# Patient Record
Sex: FEMALE | Race: BLACK OR AFRICAN AMERICAN | Employment: FULL TIME | URBAN - METROPOLITAN AREA
[De-identification: names, ages, dates, MRNs, and addresses within clinical notes are randomized per-mention and may not be internally consistent; named-entity substitution may affect disease eponyms.]

---

## 2017-10-17 DIAGNOSIS — G40.109 LOCALIZATION-RELATED EPILEPSY (H): ICD-10-CM

## 2017-10-17 RX ORDER — LEVETIRACETAM 1000 MG/1
1 TABLET ORAL 2 TIMES DAILY
Qty: 60 TABLET | Refills: 0 | Status: SHIPPED | OUTPATIENT
Start: 2017-10-17 | End: 2017-11-10

## 2017-10-17 RX ORDER — LEVETIRACETAM 250 MG/1
250 TABLET ORAL 2 TIMES DAILY
Qty: 60 TABLET | Refills: 0 | Status: SHIPPED | OUTPATIENT
Start: 2017-10-17 | End: 2017-11-10

## 2017-10-17 NOTE — TELEPHONE ENCOUNTER
[facility] Call Center    Phone Message    Name of Caller: SANTOS COONEY    Phone Number: Cell number on file:    Telephone Information:   Mobile [phone]       Best time to return call: REFILL on KEPPRA and TRILEPTAL.  [facility]  [place] [place] [facility] [address] [facility] a detailed message be left on voicemail: yes    Relation to patient: Self    Reason for Call: Other: REFILL needed.  (call came through the  or  call center)     Action Taken: Message routed to:  Adult Clinics: Neurology p [id number]

## 2017-10-17 NOTE — TELEPHONE ENCOUNTER
Called pt. Stated she only has 4 days left of medications. I did request that she schedule an appointment with Dr. Reynaga before we put her refill through. Pt did schedule with Dr. Reynaga for 11/10. I did inform pt I would give her enough medication to get her to appointment but that she would need to be seen prior to any further refills. Izzy Jeong RN      Pending Prescriptions:                       Disp   Refills    levETIRAcetam (KEPPRA) 250 MG tablet      60 tab*0            Sig: Take 1 tablet (250 mg) by mouth 2 times daily    levETIRAcetam 1000 MG TABS                60 tab*0            Sig: Take 1 tablet by mouth 2 times daily    OXcarbazepine (TRILEPTAL) 300 MG tablet   120 ta*0            Sig: TAKE TWO TABLETS BY MOUTH TWICE A DAY        Last Written Prescription Date: 12/19/16  Last Fill Quantity: 180,  # refills: 2  Last Office Visit with [facility] or [facility] prescribing provider: 12/19/16                                         Next 5 appointments (look out 90 days)     Nov 10, 2017  8:00 AM CST   Return Visit with Cheryl Kumari MD   [facility]    [address] [place] [address] [phone]                  Will route Oxycarbazepine Rx to Dr. Reynaga/MAGUI white to get it signed. Izzy Jeong RN

## 2017-10-18 RX ORDER — OXCARBAZEPINE 300 MG/1
TABLET, FILM COATED ORAL
Qty: 120 TABLET | Refills: 0 | Status: SHIPPED | OUTPATIENT
Start: 2017-10-18 | End: 2017-11-10

## 2017-11-10 DIAGNOSIS — G40.109 LOCALIZATION-RELATED EPILEPSY (H): ICD-10-CM

## 2017-11-10 RX ORDER — LEVETIRACETAM 1000 MG/1
1 TABLET ORAL 2 TIMES DAILY
Qty: 60 TABLET | Refills: 2 | Status: SHIPPED | OUTPATIENT
Start: 2017-11-10 | End: 2018-01-12

## 2017-11-10 RX ORDER — FOLIC ACID 1 MG/1
1000 TABLET ORAL 2 TIMES DAILY
Qty: 60 TABLET | Refills: 2 | Status: SHIPPED | OUTPATIENT
Start: 2017-11-10 | End: 2018-01-12

## 2017-11-10 RX ORDER — LEVETIRACETAM 250 MG/1
250 TABLET ORAL 2 TIMES DAILY
Qty: 60 TABLET | Refills: 2 | Status: SHIPPED | OUTPATIENT
Start: 2017-11-10 | End: 2018-01-12

## 2017-11-10 RX ORDER — OXCARBAZEPINE 300 MG/1
TABLET, FILM COATED ORAL
Qty: 120 TABLET | Refills: 2 | Status: SHIPPED | OUTPATIENT
Start: 2017-11-10 | End: 2018-01-12

## 2017-11-10 NOTE — TELEPHONE ENCOUNTER
[facility] Call Center    Phone Message    Name of Caller: Nancy Anderson    Phone Number: Cell number on file:    Telephone Information:   Mobile [phone]       Best time to return call: Anytime    May a detailed message be left on voicemail: yes    Relation to patient: Self    Reason for Call: Medication Refill Request    Has the patient contacted the pharmacy for the refill? No - Direct patient to contact their pharmacy.  The pharmacy will send the requests to us on their behalf.        Action Taken: Message routed to:  Adult Clinics: Neurology p [id number]

## 2017-11-10 NOTE — TELEPHONE ENCOUNTER
The pt was rescheduled for follow up for first available on 1/19/18. Request routed to Dr Reynaga to review and sign pending prescriptions.   Yuli Russell RN

## 2018-01-12 DIAGNOSIS — G40.109 LOCALIZATION-RELATED EPILEPSY (H): ICD-10-CM

## 2018-01-12 RX ORDER — FOLIC ACID 1 MG/1
1000 TABLET ORAL 2 TIMES DAILY
Qty: 60 TABLET | Refills: 0 | Status: SHIPPED | OUTPATIENT
Start: 2018-01-12 | End: 2018-02-05

## 2018-01-12 RX ORDER — OXCARBAZEPINE 300 MG/1
TABLET, FILM COATED ORAL
Qty: 120 TABLET | Refills: 0 | Status: SHIPPED | OUTPATIENT
Start: 2018-01-12 | End: 2018-02-05

## 2018-01-12 RX ORDER — LEVETIRACETAM 250 MG/1
250 TABLET ORAL 2 TIMES DAILY
Qty: 60 TABLET | Refills: 0 | Status: SHIPPED | OUTPATIENT
Start: 2018-01-12 | End: 2018-02-05

## 2018-01-12 RX ORDER — LEVETIRACETAM 1000 MG/1
1 TABLET ORAL 2 TIMES DAILY
Qty: 60 TABLET | Refills: 0 | Status: SHIPPED | OUTPATIENT
Start: 2018-01-12 | End: 2018-02-05

## 2018-01-12 NOTE — TELEPHONE ENCOUNTER
The pt arrived thinking her appt with Dr Reynaga was today. Her appt was actually made for next Friday 1/19/18. She goes to school in [place] and will not be back in town until February 4th-7th. Her appt with Juhi next week was cancelled and moved to the [facility] location for 2/5/18 at 11:30am. The pt was informed and she repeated back the date and time to me and also given the address. She will need a 1 month supply to last her until that appt. Request routed to Dr Reynaga to review and sign.  Yuli Russell RN

## 2018-02-05 ENCOUNTER — OFFICE VISIT (OUTPATIENT)
Dept: NEUROLOGY | Facility: CLINIC | Age: 23
End: 2018-02-05
Payer: COMMERCIAL

## 2018-02-05 VITALS
WEIGHT: 187.8 LBS | BODY MASS INDEX: 25.44 KG/M2 | DIASTOLIC BLOOD PRESSURE: 85 MMHG | HEIGHT: 72 IN | SYSTOLIC BLOOD PRESSURE: 132 MMHG | TEMPERATURE: 98.1 F | RESPIRATION RATE: 16 BRPM | HEART RATE: 55 BPM

## 2018-02-05 DIAGNOSIS — G40.109 LOCALIZATION-RELATED EPILEPSY (H): ICD-10-CM

## 2018-02-05 RX ORDER — OXCARBAZEPINE 300 MG/1
TABLET, FILM COATED ORAL
Qty: 540 TABLET | Refills: 3 | Status: SHIPPED | OUTPATIENT
Start: 2018-02-05 | End: 2018-08-23

## 2018-02-05 RX ORDER — FOLIC ACID 1 MG/1
1000 TABLET ORAL DAILY
Qty: 90 TABLET | Refills: 3 | Status: SHIPPED | OUTPATIENT
Start: 2018-02-05 | End: 2022-02-25

## 2018-02-05 RX ORDER — LEVETIRACETAM 250 MG/1
250 TABLET ORAL 2 TIMES DAILY
Qty: 180 TABLET | Refills: 3 | Status: SHIPPED | OUTPATIENT
Start: 2018-02-05 | End: 2019-03-11

## 2018-02-05 RX ORDER — LEVETIRACETAM 1000 MG/1
1 TABLET ORAL 2 TIMES DAILY
Qty: 180 TABLET | Refills: 3 | Status: SHIPPED | OUTPATIENT
Start: 2018-02-05 | End: 2019-03-11

## 2018-02-05 ASSESSMENT — PAIN SCALES - GENERAL: PAINLEVEL: NO PAIN (0)

## 2018-02-05 NOTE — PROGRESS NOTES
"[facility] Progress Note      Patient:  Nancy Anderson  :  1995   Age:  22 year old   Today's Office Visit:  2018    Epilepsy Data:    The patient's seizures started in  when she was 17 years old. She was traveling with her basketball team and she had a seizure. She had no warning with that. She had loss of consciousness and per description she had a generalized tonic-clonic seizure with foaming at mouth and tongue biting. She had 3 of those seizures in 1 day 2 hours apart. Mom witnessed the last one, in which she shook all over and tilted her head and her face turned purple. These happen rarely since she started on Keppra, and they only happen when she misses her medication. The last one was 2014. The patient describes she has no warning. Her mom has noted that she turns her head but does not remember which side. She stiffens up and shakes all over. She has frothing at the mouth, tongue biting and urinary incontinence. They usually last 1-2 minutes. Her eyes roll back. It takes about 15 minutes for her to come back to baseline, then she has a headache and is very sleepy.      She has another type of spell which started when she was 11 years old. She calls them dizzy spells. They have never been diagnosed as seizures. These never changed on Keppra. With those, she gets a feeling of disorientation and feeling dizzy. She says sometimes shegets spinning sensation and sometimes lightheadedness and then she loses consciousness. Mom says she will stare into space and says \"yup, yup\". She may blink. She may fumble with her hands. She also will turn her head but not sure which side and is unresponsive. She is amnestic about the event. These currently happen 5 times a month. It depends on her activity. The frequency varies. These started when she was playing sports, and they could have happened 3-4 times a week when she was very active.   Triggers for her seizures are lack of sleep, exercise. " With many of her seizures, when the blood sugar was checked, it was in the 50s, so mom thinks that not eating well is a trigger for her seizures, but she does not think so.   RISK FACTORS FOR EPILEPSY: Mom denies gestational or  complications. She was born 2 weeks early and weighed 9 pounds 14 ounces. She stayed in the hospital for 2 days for jaundice. She denies meningitis, encephalitis or febrile seizures. No family history of epilepsy. When she was between age 10-12, she used to hit her head when she was getting angry, but she never lost consciousness, and there was not a significant head injury that they are aware of.        History of Present Illness:    The patient is here for a follow up on her seizures.  She continues to have complex partial seizures about once a month.  She feels dizzy before the seizure happen, so she can get to a safe place.  They last 15-30 sec.  Since she started OXC, seizures have decreased from 3-4 times a week, but she wasn't seizure-free.  She denies side effects including unsteadiness, dizziness, blurred vision, double vision, depression, irritability or mod swings.      Current Outpatient Prescriptions   Medication Sig Dispense Refill     OXcarbazepine (TRILEPTAL) 300 MG tablet TAKE TWO TABLETS BY MOUTH TWICE A  tablet 0     levETIRAcetam 1000 MG TABS Take 1 tablet by mouth 2 times daily 60 tablet 0     levETIRAcetam (KEPPRA) 250 MG tablet Take 1 tablet (250 mg) by mouth 2 times daily 60 tablet 0     folic acid (FOLVITE) 1 MG tablet Take 1 tablet (1,000 mcg) by mouth 2 times daily 60 tablet 0     IBUPROFEN PO Take 800 mg by mouth every 6 hours as needed for moderate pain        LORazepam (LORAZEPAM INTENSOL) 2 MG/ML concentrated solution Administer 2 mg lorazepam between cheek and gum after a GTC seizure or 2 complex partial seizures in 24 hours. (Patient not taking: Reported on 2018) 30 mL 0      Results for SANTOS COONEY (MRN [id number]) as of 2018  "11:46   Ref. Range 12/19/2016 09:40   Levetiracetam Level Unknown 35.2   10 Hydroxy Metabolite Level Unknown 15.3     Perceived AED Side Effects:  No    Medication Notes:        AED Medication Compliance:  compliant most of the time    Review of Systems:  Lethargy / Tiredness:  No  Nausea / Vomiting:  No  Double Vision:  No  Sleepiness:  No  Depression:  No  Slowed Cognitive Function:  No  Memory Problems:  No  Poor Balance:  No  Dizziness:  No  Appetite Changes:  No  Blurred Vision:  No  Sleep Changes:  No  Behavioral Changes:  No  Skin: negative  Respiratory: No shortness of breath and No cough  Cardiovascular: negative  Have you experienced a traumatic fall since your last visit: NO    Other Issues:    Is patient safe to drive:  No    Woman Care:   Patient is:    Sexually Active:  Yes  Type of Birth Control: IUD  Pregnant:  no.    Exam:    /85 (BP Location: Right arm, Patient Position: Chair, Cuff Size: Adult Regular)  Pulse 55  Temp 98.1  F (36.7  C)  Resp 16  Ht 6' 0.05\" (183 cm)  Wt 187 lb 12.8 oz (85.2 kg)  BMI 25.44 kg/m2     Wt Readings from Last 5 Encounters:   02/05/18 187 lb 12.8 oz (85.2 kg)   03/17/16 178 lb 14.4 oz (81.1 kg)   08/20/15 193 lb (87.5 kg)   07/22/15 191 lb 3.2 oz (86.7 kg)   06/04/15 187 lb 11.2 oz (85.1 kg)     GENERAL APPEARANCE: Alert, awake, cooperative, in no apparent distress.   LANGUAGE AND SPEECH: No aphasia or dysarthria.   CRANIAL NERVES: Extra-ocular movements are intact, face symmetric, tongue midline.  MOTOR:normal tone, bulk and motor strength 5/5 with no drift.   COORDINATION: Normal finger-to-nose.   GAIT: Gait and tandem gait are steady.      Assessment and Plan:      Localization-related epilepsy:  The patient's seizure frequency reduced on oxcarbazepine from 3-4/m to once a month. She is taking  mg bid.  She denies side effects.  I instructed her to increase OXC to 900 mg bid.     - Increase OXC to 600-900 for 1 week and then 900 mg bid.     - " Continue LVT 1250 mg bid    - Take folic acid 1 mg daily    - RTC in 1 year        As described above, I met with the patient for 25 minutes and during this time counseling was greater than 50% of the visit time.  Cheryl Kumari MD

## 2018-02-05 NOTE — LETTER
"2018       RE: Nancy Anderson  : 1995   MRN: [id number]      Dear Colleague,    Thank you for referring your patient, Nancy Anderson, to the [facility] EPILEPSY CARE at [facility]. Please see a copy of my visit note below.    [facility]      Patient:  Nancy Anderson  :  1995   Age:  22 year old   Today's Office Visit:  2018    Epilepsy Data:    The patient's seizures started in  when she was 17 years old. She was traveling with her basketball team and she had a seizure. She had no warning with that. She had loss of consciousness and per description she had a generalized tonic-clonic seizure with foaming at mouth and tongue biting. She had 3 of those seizures in 1 day 2 hours apart. Mom witnessed the last one, in which she shook all over and tilted her head and her face turned purple. These happen rarely since she started on Keppra, and they only happen when she misses her medication. The last one was 2014. The patient describes she has no warning. Her mom has noted that she turns her head but does not remember which side. She stiffens up and shakes all over. She has frothing at the mouth, tongue biting and urinary incontinence. They usually last 1-2 minutes. Her eyes roll back. It takes about 15 minutes for her to come back to baseline, then she has a headache and is very sleepy.      She has another type of spell which started when she was 11 years old. She calls them dizzy spells. They have never been diagnosed as seizures. These never changed on Keppra. With those, she gets a feeling of disorientation and feeling dizzy. She says sometimes shegets spinning sensation and sometimes lightheadedness and then she loses consciousness. Mom says she will stare into space and says \"yup, yup\". She may blink. She may fumble with her hands. She also will turn her head but not sure which side and is unresponsive. She is amnestic about the " event. These currently happen 5 times a month. It depends on her activity. The frequency varies. These started when she was playing sports, and they could have happened 3-4 times a week when she was very active.   Triggers for her seizures are lack of sleep, exercise. With many of her seizures, when the blood sugar was checked, it was in the 50s, so mom thinks that not eating well is a trigger for her seizures, but she does not think so.   RISK FACTORS FOR EPILEPSY: Mom denies gestational or  complications. She was born 2 weeks early and weighed 9 pounds 14 ounces. She stayed in the hospital for 2 days for jaundice. She denies meningitis, encephalitis or febrile seizures. No family history of epilepsy. When she was between age 10-12, she used to hit her head when she was getting angry, but she never lost consciousness, and there was not a significant head injury that they are aware of.         History of Present Illness:    The patient is here for a follow up on her seizures.  She continues to have complex partial seizures about once a month.  She feels dizzy before the seizure happen, so she can get to a safe place.  They last 15-30 sec.  Since she started OXC, seizures have decreased from 3-4 times a week, but she wasn't seizure-free.  She denies side effects including unsteadiness, dizziness, blurred vision, double vision, depression, irritability or mod swings.      Current Outpatient Prescriptions   Medication Sig Dispense Refill     OXcarbazepine (TRILEPTAL) 300 MG tablet TAKE TWO TABLETS BY MOUTH TWICE A  tablet 0     levETIRAcetam 1000 MG TABS Take 1 tablet by mouth 2 times daily 60 tablet 0     levETIRAcetam (KEPPRA) 250 MG tablet Take 1 tablet (250 mg) by mouth 2 times daily 60 tablet 0     folic acid (FOLVITE) 1 MG tablet Take 1 tablet (1,000 mcg) by mouth 2 times daily 60 tablet 0     IBUPROFEN PO Take 800 mg by mouth every 6 hours as needed for moderate pain        LORazepam (LORAZEPAM  "INTENSOL) 2 MG/ML concentrated solution Administer 2 mg lorazepam between cheek and gum after a GTC seizure or 2 complex partial seizures in 24 hours. (Patient not taking: Reported on 2/5/2018) 30 mL 0      Results for SANTOS COONEY (MRN [id number]) as of 2/5/2018 11:46   Ref. Range 12/19/2016 09:40   Levetiracetam Level Unknown 35.2   10 Hydroxy Metabolite Level Unknown 15.3     Perceived AED Side Effects:  No    Medication Notes:        AED Medication Compliance:  compliant most of the time    Review of Systems:  Lethargy / Tiredness:  No  Nausea / Vomiting:  No  Double Vision:  No  Sleepiness:  No  Depression:  No  Slowed Cognitive Function:  No  Memory Problems:  No  Poor Balance:  No  Dizziness:  No  Appetite Changes:  No  Blurred Vision:  No  Sleep Changes:  No  Behavioral Changes:  No  Skin: negative  Respiratory: No shortness of breath and No cough  Cardiovascular: negative  Have you experienced a traumatic fall since your last visit: NO    Other Issues:    Is patient safe to drive:  No    Woman Care:   Patient is:    Sexually Active:  Yes  Type of Birth Control: IUD  Pregnant:  no.    Exam:    /85 (BP Location: Right arm, Patient Position: Chair, Cuff Size: Adult Regular)  Pulse 55  Temp 98.1  F (36.7  C)  Resp 16  Ht 6' 0.05\" (183 cm)  Wt 187 lb 12.8 oz (85.2 kg)  BMI 25.44 kg/m2     Wt Readings from Last 5 Encounters:   02/05/18 187 lb 12.8 oz (85.2 kg)   03/17/16 178 lb 14.4 oz (81.1 kg)   08/20/15 193 lb (87.5 kg)   07/22/15 191 lb 3.2 oz (86.7 kg)   06/04/15 187 lb 11.2 oz (85.1 kg)     GENERAL APPEARANCE: Alert, awake, cooperative, in no apparent distress.   LANGUAGE AND SPEECH: No aphasia or dysarthria.   CRANIAL NERVES: Extra-ocular movements are intact, face symmetric, tongue midline.  MOTOR:normal tone, bulk and motor strength 5/5 with no drift.   COORDINATION: Normal finger-to-nose.   GAIT: Gait and tandem gait are steady.      Assessment and Plan:      Localization-related epilepsy: "   The patient's seizure frequency reduced on oxcarbazepine from 3-4/m to once a month. She is taking  mg bid.  She denies side effects.  I instructed her to increase OXC to 900 mg bid.     - Increase OXC to 600-900 for 1 week and then 900 mg bid.     - Continue LVT 1250 mg bid    - Take folic acid 1 mg daily    - RTC in 1 year        As described above, I met with the patient for 25 minutes and during this time counseling was greater than 50% of the visit time.  Cheryl Kumari MD                          Again, thank you for allowing me to participate in the care of your patient.      Sincerely,    Cheryl Kumari MD

## 2018-02-05 NOTE — MR AVS SNAPSHOT
After Visit Summary   2018    Nancy Anderson    MRN: [id number]           Patient Information     Date Of Birth          1995        Visit Information        Provider Department      2018 11:30 AM Cheryl Kumari MD [facility]        Today's Diagnoses     Localization-related epilepsy (H)           Follow-ups after your visit        Follow-up notes from your care team     Return in about 1 year (around 2019).      Your next 10 appointments already scheduled     Aug 09, 2018 11:30 AM CDT   Telephone Call with MD [facility] ([facility])    [address], [address]  [facility] [address] [phone]           Note: This is not an onsite visit; there is no need to come to the facility.              Who to contact     Please call your clinic at [phone] to:    Ask questions about your health    Make or cancel appointments    Discuss your medicines    Learn about your test results    Speak to your doctor   If you have compliments or concerns about an experience at your clinic, or if you wish to file a complaint, please contact [facility] Patient Relations at [phone] or email us at [email or web address]         Additional Information About Your Visit        MyChart Information     MOOVIAt is an electronic gateway that provides easy, online access to your medical records. With Easiaid, you can request a clinic appointment, read your test results, renew a prescription or communicate with your care team.     To sign up for MOOVIAt visit the website at [email or web address]   You will be asked to enter the access code listed below, as well as some personal information. Please follow the directions to create your username and password.     Your access code is: [email or web address]  Expires: 2018 11:58 AM     Your access code will  in 90 days. If you need help or a new  "code, please contact your [facility] or call [phone] for assistance.        Care EveryWhere ID     This is your Care EveryWhere ID. This could be used by other organizations to access your [facility] medical records  [id number]        Your Vitals Were     Pulse Temperature Respirations Height BMI (Body Mass Index)       55 98.1  F (36.7  C) 16 6' 0.05\" (183 cm) 25.44 kg/m2        Blood Pressure from Last 3 Encounters:   02/05/18 132/85   12/19/16 127/78   03/17/16 113/74    Weight from Last 3 Encounters:   02/05/18 187 lb 12.8 oz (85.2 kg)   03/17/16 178 lb 14.4 oz (81.1 kg)   08/20/15 193 lb (87.5 kg)              Today, you had the following     No orders found for display         Today's Medication Changes          These changes are accurate as of 2/5/18 11:58 AM.  If you have any questions, ask your nurse or doctor.               These medicines have changed or have updated prescriptions.        Dose/Directions    folic acid 1 MG tablet   Commonly known as:  FOLVITE   This may have changed:  when to take this   Used for:  Localization-related epilepsy (H)   Changed by:  Cheryl Kumari MD        Dose:  1000 mcg   Take 1 tablet (1,000 mcg) by mouth daily   Quantity:  90 tablet   Refills:  3       OXcarbazepine 300 MG tablet   Commonly known as:  TRILEPTAL   This may have changed:  additional instructions   Used for:  Localization-related epilepsy (H)   Changed by:  Cheryl Kumari MD        TAKE THREE TABLETS BY MOUTH TWICE A DAY   Quantity:  540 tablet   Refills:  3            Where to get your medicines      [facility]  [place] [facility] [address]  [address] [address]     Phone:  [phone]     folic acid 1 MG tablet    levETIRAcetam 1000 MG Tabs    levETIRAcetam 250 MG tablet    OXcarbazepine 300 MG tablet                Primary Care Provider Fax #    Physician No " Ref-Primary [phone]       No address on file        Equal Access to Services     MOSES YIRUSTY : Hadii andi caal yuliet Oseguera, wacallida rebekahsharri, samra glez jenndanielle, adolfo libbyin hayaaloy barajasantolin villegas lasylvainloy akhil. So [facility] [phone].    ATENCIÓN: Si habla español, tiene a cervantes disposición servicios gratuitos de asistencia lingüística. Llame al [phone].    We comply with applicable federal civil rights laws and [place] laws. We do not discriminate on the basis of race, color, national origin, age, disability, sex, sexual orientation, or gender identity.            Thank you!     Thank you for choosing [facility] EPILEPSY [facility]  for your care. Our goal is always to provide you with excellent care. Hearing back from our patients is one way we can continue to improve our services. Please take a few minutes to complete the written survey that you may receive in the mail after your visit with us. Thank you!             Your Updated Medication List - Protect others around you: Learn how to safely use, store and throw away your medicines at www.disposemymeds.org.          This list is accurate as of 2/5/18 11:58 AM.  Always use your most recent med list.                   Brand Name Dispense Instructions for use Diagnosis    folic acid 1 MG tablet    FOLVITE    90 tablet    Take 1 tablet (1,000 mcg) by mouth daily    Localization-related epilepsy (H)       IBUPROFEN PO      Take 800 mg by mouth every 6 hours as needed for moderate pain        * levETIRAcetam 250 MG tablet    KEPPRA    180 tablet    Take 1 tablet (250 mg) by mouth 2 times daily    Localization-related epilepsy (H)       * levETIRAcetam 1000 MG Tabs     180 tablet    Take 1 tablet by mouth 2 times daily    Localization-related epilepsy (H)       LORazepam 2 MG/ML (HIGH CONC) solution    LORazepam INTENSOL    30 mL    Administer 2 mg lorazepam between cheek and gum after a GTC seizure or 2 complex partial seizures in 24 hours.    Localization-related  epilepsy (H)       OXcarbazepine 300 MG tablet    TRILEPTAL    540 tablet    TAKE THREE TABLETS BY MOUTH TWICE A DAY    Localization-related epilepsy (H)       * Notice:  This list has 2 medication(s) that are the same as other medications prescribed for you. Read the directions carefully, and ask your doctor or other care provider to review them with you.

## 2018-08-09 ENCOUNTER — VIRTUAL VISIT (OUTPATIENT)
Dept: NEUROLOGY | Facility: CLINIC | Age: 23
End: 2018-08-09
Payer: COMMERCIAL

## 2018-08-09 DIAGNOSIS — G40.109 FOCAL EPILEPSY (H): Primary | ICD-10-CM

## 2018-08-09 NOTE — MR AVS SNAPSHOT
After Visit Summary   2018    Nancy Anderson    MRN: [id number]           Patient Information     Date Of Birth          1995        Visit Information        Provider Department      2018 11:30 AM Cheryl Kumari MD [facility]        Today's Diagnoses     Focal epilepsy (H)    -  1       Follow-ups after your visit        Follow-up notes from your care team     Return in about 1 year (around 2019).      Your next 10 appointments already scheduled     Flako 10, 2019  9:30 AM CST   Return Visit with MD [facility] ([facility])    [address], [address]  [facility] [address] [phone]              Who to contact     Please call your clinic at [phone] to:    Ask questions about your health    Make or cancel appointments    Discuss your medicines    Learn about your test results    Speak to your doctor            Additional Information About Your Visit        MyChart Information     [employer] is an electronic gateway that provides easy, online access to your medical records. With [employer], you can request a clinic appointment, read your test results, renew a prescription or communicate with your care team.     To sign up for [employer] visit the website at [email or web address]   You will be asked to enter the access code listed below, as well as some personal information. Please follow the directions to create your username and password.     Your access code is: [email or web address]  Expires: 2018 11:35 AM     Your access code will  in 90 days. If you need help or a new code, please contact your [facility] or call [phone] for assistance.        Care EveryWhere ID     This is your Care EveryWhere ID. This could be used by other organizations to access your [facility] medical records  [id number]         Blood Pressure from Last 3 Encounters:   18 132/85   16  127/78   03/17/16 113/74    Weight from Last 3 Encounters:   02/05/18 187 lb 12.8 oz (85.2 kg)   03/17/16 178 lb 14.4 oz (81.1 kg)   08/20/15 193 lb (87.5 kg)              Today, you had the following     No orders found for display       Primary Care Provider Fax #    Physician No Ref-Primary [phone]       No address on file        Equal Access to Services     MOSES GOOD : Hadreji matiaso Sojustin, waaxda luqadaha, qaybta kaalmada adeegyada, adolfo biggs luis armandoloy shermandayanaramaryan demarco . So [facility] [place] [phone].    ATENCIÓN: Si habla español, tiene a cervantes disposición servicios gratuitos de asistencia lingüística. Llame al [phone].    We comply with applicable federal civil rights laws and [place] laws. We do not discriminate on the basis of race, color, national origin, age, disability, sex, sexual orientation, or gender identity.            Thank you!     Thank you for choosing [facility]  for your care. Our goal is always to provide you with excellent care. Hearing back from our patients is one way we can continue to improve our services. Please take a few minutes to complete the written survey that you may receive in the mail after your visit with us. Thank you!             Your Updated Medication List - Protect others around you: Learn how to safely use, store and throw away your medicines at www.disposemymeds.org.          This list is accurate as of 8/9/18 11:35 AM.  Always use your most recent med list.                   Brand Name Dispense Instructions for use Diagnosis    folic acid 1 MG tablet    FOLVITE    90 tablet    Take 1 tablet (1,000 mcg) by mouth daily    Localization-related epilepsy (H)       IBUPROFEN PO      Take 800 mg by mouth every 6 hours as needed for moderate pain        * levETIRAcetam 250 MG tablet    KEPPRA    180 tablet    Take 1 tablet (250 mg) by mouth 2 times daily    Localization-related epilepsy (H)       * levETIRAcetam 1000 MG Tabs     180 tablet    Take 1  tablet by mouth 2 times daily    Localization-related epilepsy (H)       LORazepam 2 MG/ML (HIGH CONC) solution    LORazepam INTENSOL    30 mL    Administer 2 mg lorazepam between cheek and gum after a GTC seizure or 2 complex partial seizures in 24 hours.    Localization-related epilepsy (H)       OXcarbazepine 300 MG tablet    TRILEPTAL    540 tablet    TAKE THREE TABLETS BY MOUTH TWICE A DAY    Localization-related epilepsy (H)       * Notice:  This list has 2 medication(s) that are the same as other medications prescribed for you. Read the directions carefully, and ask your doctor or other care provider to review them with you.

## 2018-08-23 ENCOUNTER — TELEPHONE (OUTPATIENT)
Dept: NEUROLOGY | Facility: CLINIC | Age: 23
End: 2018-08-23

## 2018-08-23 DIAGNOSIS — G40.109 LOCALIZATION-RELATED EPILEPSY (H): ICD-10-CM

## 2018-08-23 RX ORDER — OXCARBAZEPINE 600 MG/1
600 TABLET, FILM COATED ORAL 2 TIMES DAILY
Qty: 180 TABLET | Refills: 3 | Status: SHIPPED | OUTPATIENT
Start: 2018-08-23 | End: 2019-04-08

## 2018-08-23 RX ORDER — OXCARBAZEPINE 300 MG/1
TABLET, FILM COATED ORAL
Qty: 360 TABLET | Refills: 1 | Status: SHIPPED | OUTPATIENT
Start: 2018-08-23 | End: 2018-08-24 | Stop reason: DRUGHIGH

## 2018-08-23 NOTE — TELEPHONE ENCOUNTER
Per Dr. Reynaga recent note:  - Continue Keppra 1250 mg bid and  mg bid  - Call with seizures  - RTC in 1 year    Contacted patient. She would prefer to take a 600 mg tablet twice daily. It appears that this is an option. This medication is pended for Dr. Reynaga to review and approve if advised. Contacted [facility] to inform them to hold off on filling the other prescription for 300 mg tablet as we are awaiting signature from Dr. Reynaga for 600 mg tablet.    Routing to Dr. Reynaga.    Sonya Curran RN

## 2018-08-23 NOTE — TELEPHONE ENCOUNTER
8.23.18      Medication needs to be updated.  Patient talked to Dr Reynaga last week and she was going to call pharmacy and update.     Trileptal 300mg    Wants 2 tablets twice a day, not 3 tablets once a day.      [facility] [place] [facility] [address] [place], [place] [address]    Call patient when completed or if they have any questions.

## 2019-03-08 DIAGNOSIS — G40.109 LOCALIZATION-RELATED EPILEPSY (H): ICD-10-CM

## 2019-03-08 RX ORDER — LEVETIRACETAM 1000 MG/1
1 TABLET ORAL 2 TIMES DAILY
Qty: 180 TABLET | Refills: 3 | Status: CANCELLED | OUTPATIENT
Start: 2019-03-08

## 2019-03-08 RX ORDER — LEVETIRACETAM 250 MG/1
250 TABLET ORAL 2 TIMES DAILY
Qty: 180 TABLET | Refills: 3 | Status: CANCELLED | OUTPATIENT
Start: 2019-03-08

## 2019-04-08 ENCOUNTER — OFFICE VISIT (OUTPATIENT)
Dept: NEUROLOGY | Facility: CLINIC | Age: 24
End: 2019-04-08

## 2019-04-08 VITALS
BODY MASS INDEX: 25.63 KG/M2 | RESPIRATION RATE: 16 BRPM | WEIGHT: 189.2 LBS | SYSTOLIC BLOOD PRESSURE: 132 MMHG | DIASTOLIC BLOOD PRESSURE: 80 MMHG

## 2019-04-08 DIAGNOSIS — G40.109 FOCAL EPILEPSY (H): Primary | ICD-10-CM

## 2019-04-08 DIAGNOSIS — G40.109 FOCAL EPILEPSY (H): ICD-10-CM

## 2019-04-08 DIAGNOSIS — G40.109 LOCALIZATION-RELATED EPILEPSY (H): ICD-10-CM

## 2019-04-08 LAB — SODIUM SERPL-SCNC: 139 MMOL/L (ref 133–144)

## 2019-04-08 PROCEDURE — 84295 ASSAY OF SERUM SODIUM: CPT | Performed by: PSYCHIATRY & NEUROLOGY

## 2019-04-08 PROCEDURE — 99000 SPECIMEN HANDLING OFFICE-LAB: CPT | Performed by: PSYCHIATRY & NEUROLOGY

## 2019-04-08 PROCEDURE — 36415 COLL VENOUS BLD VENIPUNCTURE: CPT | Performed by: PSYCHIATRY & NEUROLOGY

## 2019-04-08 PROCEDURE — 80177 DRUG SCRN QUAN LEVETIRACETAM: CPT | Mod: 90 | Performed by: PSYCHIATRY & NEUROLOGY

## 2019-04-08 PROCEDURE — 80183 DRUG SCRN QUANT OXCARBAZEPIN: CPT | Mod: 90 | Performed by: PSYCHIATRY & NEUROLOGY

## 2019-04-08 RX ORDER — LEVETIRACETAM 250 MG/1
250 TABLET ORAL 2 TIMES DAILY
Qty: 180 TABLET | Refills: 3 | Status: SHIPPED | OUTPATIENT
Start: 2019-04-08 | End: 2020-04-21

## 2019-04-08 RX ORDER — OXCARBAZEPINE 600 MG/1
600 TABLET, FILM COATED ORAL 2 TIMES DAILY
Qty: 180 TABLET | Refills: 3 | Status: SHIPPED | OUTPATIENT
Start: 2019-04-08 | End: 2020-03-05

## 2019-04-08 RX ORDER — LEVETIRACETAM 1000 MG/1
1000 TABLET ORAL 2 TIMES DAILY
Qty: 180 TABLET | Refills: 3 | Status: SHIPPED | OUTPATIENT
Start: 2019-04-08 | End: 2020-03-05

## 2019-04-08 ASSESSMENT — PAIN SCALES - GENERAL: PAINLEVEL: NO PAIN (0)

## 2019-04-08 NOTE — PROGRESS NOTES
"P/[facility] Progress Note      Patient:  Nancy Anderson  :  1995   Age:  23 year old   Today's Office Visit:  2019    Epilepsy Data:  The patient's seizures started in  when she was 17 years old. She was traveling with her basketball team and she had a seizure. She had no warning with that. She had loss of consciousness and per description she had a generalized tonic-clonic seizure with foaming at mouth and tongue biting. She had 3 of those seizures in 1 day 2 hours apart. Mom witnessed the last one, in which she shook all over and tilted her head and her face turned purple. These happen rarely since she started on Keppra, and they only happen when she misses her medication. The last one was 2014. The patient describes she has no warning. Her mom has noted that she turns her head but does not remember which side. She stiffens up and shakes all over. She has frothing at the mouth, tongue biting and urinary incontinence. They usually last 1-2 minutes. Her eyes roll back. It takes about 15 minutes for her to come back to baseline, then she has a headache and is very sleepy.      She has another type of spell which started when she was 11 years old. She calls them dizzy spells. They have never been diagnosed as seizures. These never changed on Keppra. With those, she gets a feeling of disorientation and feeling dizzy. She says sometimes shegets spinning sensation and sometimes lightheadedness and then she loses consciousness. Mom says she will stare into space and says \"yup, yup\". She may blink. She may fumble with her hands. She also will turn her head but not sure which side and is unresponsive. She is amnestic about the event. These currently happen 5 times a month. It depends on her activity. The frequency varies. These started when she was playing sports, and they could have happened 3-4 times a week when she was very active.   Triggers for her seizures are lack of sleep, exercise. " With many of her seizures, when the blood sugar was checked, it was in the 50s, so mom thinks that not eating well is a trigger for her seizures, but she does not think so.   RISK FACTORS FOR EPILEPSY: Mom denies gestational or  complications. She was born 2 weeks early and weighed 9 pounds 14 ounces. She stayed in the hospital for 2 days for jaundice. She denies meningitis, encephalitis or febrile seizures. No family history of epilepsy. When she was between age 10-12, she used to hit her head when she was getting angry, but she never lost consciousness, and there was not a significant head injury that they are aware of.        History of Present Illness:    The patient is here for a follow up for her seizures. She had seizures whenever she missed her medication if she had to rush to her classes. She thinks she had 2 seizures since last visit. She usually has an aura of feeling dizzy, she smacked her lips and lost consciousness lasting about a minute. Then she is confused for 30 seconds and then she wants to take a nap. Last one was 3 months ago.  She is taking  mg bid and Keppra 1250 mg bid. Denies side effects.     Current Outpatient Medications   Medication Sig Dispense Refill     folic acid (FOLVITE) 1 MG tablet Take 1 tablet (1,000 mcg) by mouth daily 90 tablet 3     IBUPROFEN PO Take 800 mg by mouth every 6 hours as needed for moderate pain        levETIRAcetam (KEPPRA) 1000 MG tablet Take 1 tablet (1,000 mg) by mouth 2 times daily 60 tablet 0     levETIRAcetam (KEPPRA) 250 MG tablet Take 1 tablet (250 mg) by mouth 2 times daily 60 tablet 0     OXcarbazepine (TRILEPTAL) 600 MG tablet Take 1 tablet (600 mg) by mouth 2 times daily 180 tablet 3     LORazepam (LORAZEPAM INTENSOL) 2 MG/ML concentrated solution Administer 2 mg lorazepam between cheek and gum after a GTC seizure or 2 complex partial seizures in 24 hours. (Patient not taking: Reported on 2018) 30 mL 0      Perceived AED Side  Effects:  No    Medication Notes:        AED Medication Compliance:  noncompliant some of the time, forgets to take her medication when she is in a rush to go to her class, happens rarely.     Review of Systems:  Lethargy / Tiredness:  No  Nausea / Vomiting:  No  Double Vision:  No  Sleepiness:  No  Depression:  No  Slowed Cognitive Function:  No  Memory Problems:  No  Poor Balance:  No  Dizziness:  No  Appetite Changes:  No  Blurred Vision:  No  Sleep Changes:  No  Behavioral Changes:  No  Skin: negative  Respiratory: No shortness of breath and No cough  Cardiovascular: negative  Have you experienced a traumatic fall since your last visit: NO      Woman Care:   Patient is:    Sexually Active:  Yes  Type of Birth Control:  insertion  Pregnant:  no.  Planning to become pregnant:  No  Currently Breastfeeding:  No    Exam:    /80   Resp 16   Wt 189 lb 3.2 oz (85.8 kg)   BMI 25.63 kg/m       Wt Readings from Last 5 Encounters:   04/08/19 189 lb 3.2 oz (85.8 kg)   02/05/18 187 lb 12.8 oz (85.2 kg)   03/17/16 178 lb 14.4 oz (81.1 kg)   08/20/15 193 lb (87.5 kg)   07/22/15 191 lb 3.2 oz (86.7 kg)     General Appearance: Alert, awake, cooperative, pleasant, NAD  Gait and tandem gait: steady  Attention Span:  Normal  Language/speech: no aphasia or dysarthria  Extraocular Movements:  Normal  Coordination:  Normal FNF  Facial Sensation:  Normal  Facial Strength:  Normal  Tongue Strength:  Normal  Motor Exam: normal tone, bulk and strength 5/5 bilaterally  Limb Sensation:  Normal  DTRs: 2+ symmetric, toes downgoing    Assessment and Plan:    Focal epilepsy: The patient had 2 seizures since last visit in the setting of missing medications. I emphasized on medication compliance. I suggested using a pill box and alarm. She is taking Keppra 1250 mg bid and -600 (instead of 600-900). She is not willing to go back on 600-900.   She is sexually active, but doesn't plan to get pregnant any time soon. She has an IUD.       - Take AEDs as before.  - Obtain AED levels for efficacy and compliance.  - RTC in 1 year.       As described above, I met with the patient for 25 minutes and during this time counseling was greater than 50% of the visit time.  Cheryl Kumari MD

## 2019-04-08 NOTE — LETTER
"2019       RE: Nancy Anderson  : 1995   MRN: [id number]      Dear Colleague,    Thank you for referring your patient, Nancy Anderson, to the [facility] Please see a copy of my visit note below.    [facility]      Patient:  Nancy Anderson  :  1995   Age:  23 year old   Today's Office Visit:  2019    Epilepsy Data:  The patient's seizures started in  when she was 17 years old. She was traveling with her basketball team and she had a seizure. She had no warning with that. She had loss of consciousness and per description she had a generalized tonic-clonic seizure with foaming at mouth and tongue biting. She had 3 of those seizures in 1 day 2 hours apart. Mom witnessed the last one, in which she shook all over and tilted her head and her face turned purple. These happen rarely since she started on Keppra, and they only happen when she misses her medication. The last one was 2014. The patient describes she has no warning. Her mom has noted that she turns her head but does not remember which side. She stiffens up and shakes all over. She has frothing at the mouth, tongue biting and urinary incontinence. They usually last 1-2 minutes. Her eyes roll back. It takes about 15 minutes for her to come back to baseline, then she has a headache and is very sleepy.      She has another type of spell which started when she was 11 years old. She calls them dizzy spells. They have never been diagnosed as seizures. These never changed on Keppra. With those, she gets a feeling of disorientation and feeling dizzy. She says sometimes shegets spinning sensation and sometimes lightheadedness and then she loses consciousness. Mom says she will stare into space and says \"yup, yup\". She may blink. She may fumble with her hands. She also will turn her head but not sure which side and is unresponsive. She is amnestic about the " event. These currently happen 5 times a month. It depends on her activity. The frequency varies. These started when she was playing sports, and they could have happened 3-4 times a week when she was very active.   Triggers for her seizures are lack of sleep, exercise. With many of her seizures, when the blood sugar was checked, it was in the 50s, so mom thinks that not eating well is a trigger for her seizures, but she does not think so.   RISK FACTORS FOR EPILEPSY: Mom denies gestational or  complications. She was born 2 weeks early and weighed 9 pounds 14 ounces. She stayed in the hospital for 2 days for jaundice. She denies meningitis, encephalitis or febrile seizures. No family history of epilepsy. When she was between age 9-12, she used to hit her head when she was getting angry, but she never lost consciousness, and there was not a significant head injury that they are aware of.        History of Present Illness:    The patient is here for a follow up for her seizures. She had seizures whenever she missed her medication if she had to rush to her classes. She thinks she had 2 seizures since last visit. She usually has an aura of feeling dizzy, she smacked her lips and lost consciousness lasting about a minute. Then she is confused for 30 seconds and then she wants to take a nap. Last one was 3 months ago.  She is taking  mg bid and Keppra 1250 mg bid. Denies side effects.     Current Outpatient Medications   Medication Sig Dispense Refill     folic acid (FOLVITE) 1 MG tablet Take 1 tablet (1,000 mcg) by mouth daily 90 tablet 3     IBUPROFEN PO Take 800 mg by mouth every 6 hours as needed for moderate pain        levETIRAcetam (KEPPRA) 1000 MG tablet Take 1 tablet (1,000 mg) by mouth 2 times daily 60 tablet 0     levETIRAcetam (KEPPRA) 250 MG tablet Take 1 tablet (250 mg) by mouth 2 times daily 60 tablet 0     OXcarbazepine (TRILEPTAL) 600 MG tablet Take 1 tablet (600 mg) by mouth 2 times daily  180 tablet 3     LORazepam (LORAZEPAM INTENSOL) 2 MG/ML concentrated solution Administer 2 mg lorazepam between cheek and gum after a GTC seizure or 2 complex partial seizures in 24 hours. (Patient not taking: Reported on 2/5/2018) 30 mL 0      Perceived AED Side Effects:  No    Medication Notes:        AED Medication Compliance:  noncompliant some of the time, forgets to take her medication when she is in a rush to go to her class, happens rarely.     Review of Systems:  Lethargy / Tiredness:  No  Nausea / Vomiting:  No  Double Vision:  No  Sleepiness:  No  Depression:  No  Slowed Cognitive Function:  No  Memory Problems:  No  Poor Balance:  No  Dizziness:  No  Appetite Changes:  No  Blurred Vision:  No  Sleep Changes:  No  Behavioral Changes:  No  Skin: negative  Respiratory: No shortness of breath and No cough  Cardiovascular: negative  Have you experienced a traumatic fall since your last visit: NO      Woman Care:   Patient is:    Sexually Active:  Yes  Type of Birth Control:  insertion  Pregnant:  no.  Planning to become pregnant:  No  Currently Breastfeeding:  No    Exam:    /80   Resp 16   Wt 189 lb 3.2 oz (85.8 kg)   BMI 25.63 kg/m        Wt Readings from Last 5 Encounters:   04/08/19 189 lb 3.2 oz (85.8 kg)   02/05/18 187 lb 12.8 oz (85.2 kg)   03/17/16 178 lb 14.4 oz (81.1 kg)   08/20/15 193 lb (87.5 kg)   07/22/15 191 lb 3.2 oz (86.7 kg)     General Appearance: Alert, awake, cooperative, pleasant, NAD  Gait and tandem gait: steady  Attention Span:  Normal  Language/speech: no aphasia or dysarthria  Extraocular Movements:  Normal  Coordination:  Normal FNF  Facial Sensation:  Normal  Facial Strength:  Normal  Tongue Strength:  Normal  Motor Exam: normal tone, bulk and strength 5/5 bilaterally  Limb Sensation:  Normal  DTRs: 2+ symmetric, toes downgoing    Assessment and Plan:    Focal epilepsy: The patient had 2 seizures since last visit in the setting of missing medications. I emphasized on  medication compliance. I suggested using a pill box and alarm. She is taking Keppra 1250 mg bid and -600 (instead of 600-900). She is not willing to go back on 600-900.   She is sexually active, but doesn't plan to get pregnant any time soon. She has an IUD.      - Take AEDs as before.  - Obtain AED levels for efficacy and compliance.  - RTC in 1 year.       As described above, I met with the patient for 25 minutes and during this time counseling was greater than 50% of the visit time.  Cheryl Kumari MD                        Again, thank you for allowing me to participate in the care of your patient.      Sincerely,    Cheryl Kumari MD

## 2019-04-09 LAB
10OH-CARBAZEPINE SERPL-MCNC: 18.2 UG/ML (ref 10–35)
LEVETIRACETAM SERPL-MCNC: 35 UG/ML (ref 12–46)

## 2019-04-19 ENCOUNTER — HEALTH MAINTENANCE LETTER (OUTPATIENT)
Age: 24
End: 2019-04-19

## 2019-11-06 DIAGNOSIS — G40.109 LOCALIZATION-RELATED EPILEPSY (H): ICD-10-CM

## 2019-11-06 NOTE — TELEPHONE ENCOUNTER
Rx Authorization:    Requested Medication/ Dose levETIRAcetam (KEPPRA) 1000 MG tablet    Date last refill ordered: 04/08/19    Quantity ordered: 180    # refills: 3    Date of last clinic visit with ordering provider: 04/08/19    Date of next clinic visit with ordering provider: None Scheduled     All pertinent protocol data (lab date/result):     Include pertinent information from patients message:     Rx Authorization:    Requested Medication/ Dose levETIRAcetam (KEPPRA) 250 MG tablet    Date last refill ordered: 04/08/19    Quantity ordered: 180    # refills: 3    Date of last clinic visit with ordering provider: 04/08/19    Date of next clinic visit with ordering provider: None Scheduled     All pertinent protocol data (lab date/result):     Include pertinent information from patients message:     Rx Authorization:    Requested Medication/ Dose Oxcarbazepine (TRILEPTAL) 600 MG tablet     Date last refill ordered: 04/08/19    Quantity ordered: 180    # refills: 3    Date of last clinic visit with ordering provider: 04/08/19    Date of next clinic visit with ordering provider: None Scheduled     All pertinent protocol data (lab date/result):     Include pertinent information from patients message:

## 2019-11-07 RX ORDER — LEVETIRACETAM 250 MG/1
250 TABLET ORAL 2 TIMES DAILY
Qty: 180 TABLET | Refills: 3 | OUTPATIENT
Start: 2019-11-07

## 2019-11-07 RX ORDER — OXCARBAZEPINE 600 MG/1
600 TABLET, FILM COATED ORAL 2 TIMES DAILY
Qty: 180 TABLET | Refills: 3 | OUTPATIENT
Start: 2019-11-07

## 2019-11-07 RX ORDER — LEVETIRACETAM 1000 MG/1
1000 TABLET ORAL 2 TIMES DAILY
Qty: 180 TABLET | Refills: 3 | OUTPATIENT
Start: 2019-11-07

## 2020-02-23 ENCOUNTER — HEALTH MAINTENANCE LETTER (OUTPATIENT)
Age: 25
End: 2020-02-23

## 2020-03-04 DIAGNOSIS — G40.109 LOCALIZATION-RELATED EPILEPSY (H): ICD-10-CM

## 2020-03-05 RX ORDER — OXCARBAZEPINE 600 MG/1
600 TABLET, FILM COATED ORAL 2 TIMES DAILY
Qty: 180 TABLET | Refills: 0 | Status: SHIPPED | OUTPATIENT
Start: 2020-03-05 | End: 2020-04-21

## 2020-03-05 RX ORDER — LEVETIRACETAM 1000 MG/1
1000 TABLET ORAL 2 TIMES DAILY
Qty: 180 TABLET | Refills: 0 | Status: SHIPPED | OUTPATIENT
Start: 2020-03-05 | End: 2020-04-21

## 2020-03-05 NOTE — TELEPHONE ENCOUNTER
OXcarbazepine (TRILEPTAL) 600 MG tablet  Last Written Prescription Date:  4/8/2019  Last Fill Quantity: 180,   # refills: 3  Last Office Visit : 4/8/2019  Future Office visit:  4/21/2020  180 Tabs, 0 Refills sent to pharm 3/5/2020    levETIRAcetam (KEPPRA) 1000 MG tablet  Last Written Prescription Date:  4/8/2019  Last Fill Quantity: 180,   # refills: 3  Last Office Visit : 4/8/2019  Future Office visit:  4/21/2020  180 Tabs, 0 Refills sent to pharm 3/5/2020      Faviola Hampton RN  Central Triage Red Flags/Med Refills      Assessment and Plan:   4/8/2019     Focal epilepsy: The patient had 2 seizures since last visit in the setting of missing medications. I emphasized on medication compliance. I suggested using a pill box and alarm. She is taking Keppra 1250 mg bid and -600 (instead of 600-900). She is not willing to go back on 600-900.   She is sexually active, but doesn't plan to get pregnant any time soon. She has an IUD.       - Take AEDs as before.  - Obtain AED levels for efficacy and compliance.  - RTC in 1 year.         As described above, I met with the patient for 25 minutes and during this time counseling was greater than 50% of the visit time.  Cheryl Kumari MD

## 2020-04-21 ENCOUNTER — VIRTUAL VISIT (OUTPATIENT)
Dept: NEUROLOGY | Facility: CLINIC | Age: 25
End: 2020-04-21
Payer: COMMERCIAL

## 2020-04-21 DIAGNOSIS — G40.109 LOCALIZATION-RELATED EPILEPSY (H): ICD-10-CM

## 2020-04-21 RX ORDER — OXCARBAZEPINE 600 MG/1
600 TABLET, FILM COATED ORAL 2 TIMES DAILY
Qty: 180 TABLET | Refills: 3 | Status: SHIPPED | OUTPATIENT
Start: 2020-04-21 | End: 2021-07-01

## 2020-04-21 RX ORDER — LEVETIRACETAM 1000 MG/1
1000 TABLET ORAL 2 TIMES DAILY
Qty: 180 TABLET | Refills: 3 | Status: SHIPPED | OUTPATIENT
Start: 2020-04-21 | End: 2021-07-01

## 2020-04-21 RX ORDER — LEVETIRACETAM 250 MG/1
250 TABLET ORAL 2 TIMES DAILY
Qty: 180 TABLET | Refills: 3 | Status: SHIPPED | OUTPATIENT
Start: 2020-04-21 | End: 2021-07-01

## 2020-04-21 NOTE — PROGRESS NOTES
"Nancy Anderson is a 24 year old female who is being evaluated via a billable video visit.      The patient has been notified of following:     \"This video visit will be conducted via a call between you and your physician/provider. We have found that certain health care needs can be provided without the need for an in-person physical exam.  This service lets us provide the care you need with a video conversation.  If a prescription is necessary we can send it directly to your pharmacy.  If lab work is needed we can place an order for that and you can then stop by our lab to have the test done at a later time.    Video visits are billed at different rates depending on your insurance coverage.  Please reach out to your insurance provider with any questions.    If during the course of the call the physician/provider feels a video visit is not appropriate, you will not be charged for this service.\"    Patient has given verbal consent for Video visit? Yes    How would you like to obtain your AVS? Shruti    Patient would like the video invitation sent by: Send to e-mail at: [email or web address]    Will anyone else be joining your video visit? NO    Thank you  Tang Stern LPN    Video-Visit Details    Type of service:  Video Visit    Originating Location (pt. Location):Home  Distant Location (provider location):  [facility] EPILEPSY CARE     Mode of Communication:  Video Conference via [facility]      Patient:  Nancy Anderson  :  1995   Age:  24 year old   Today's Virtual Visit:  2020    Epilepsy Data:     The patient's seizures started in  when she was 17 years old. She was traveling with her basketball team and she had a seizure. She had no warning with that. She had loss of consciousness and per description she had a generalized tonic-clonic seizure with foaming at mouth and tongue biting. She had 3 of those seizures in 1 day 2 hours apart. Mom witnessed the " "last one, in which she shook all over and tilted her head and her face turned purple. These happen rarely since she started on Keppra, and they only happen when she misses her medication. The last one was 2014. The patient describes she has no warning. Her mom has noted that she turns her head but does not remember which side. She stiffens up and shakes all over. She has frothing at the mouth, tongue biting and urinary incontinence. They usually last 1-2 minutes. Her eyes roll back. It takes about 15 minutes for her to come back to baseline, then she has a headache and is very sleepy.      She has another type of spell which started when she was 11 years old. She calls them dizzy spells. They have never been diagnosed as seizures. These never changed on Keppra. With those, she gets a feeling of disorientation and feeling dizzy. She says sometimes shegets spinning sensation and sometimes lightheadedness and then she loses consciousness. Mom says she will stare into space and says \"yup, yup\". She may blink. She may fumble with her hands. She also will turn her head but not sure which side and is unresponsive. She is amnestic about the event. These currently happen 5 times a month. It depends on her activity. The frequency varies. These started when she was playing sports, and they could have happened 3-4 times a week when she was very active.   Triggers for her seizures are lack of sleep, exercise. With many of her seizures, when the blood sugar was checked, it was in the 50s, so mom thinks that not eating well is a trigger for her seizures, but she does not think so.   RISK FACTORS FOR EPILEPSY: Mom denies gestational or  complications. She was born 2 weeks early and weighed 9 pounds 14 ounces. She stayed in the hospital for 2 days for jaundice. She denies meningitis, encephalitis or febrile seizures. No family history of epilepsy. When she was between age 10-12, she used to hit her head when she was " getting angry, but she never lost consciousness, and there was not a significant head injury that they are aware of.         History of Present Illness:     The patient did not have any seizures since last visit in 4/2019.  She is taking  mg bid and Keppra 1250 mg bid. Denies side effects including dizziness/imbalance, double vision/blurred vision, tiredness, sleepiness or mood changes.      Other issues: The patient is not sexually active.  However she is taking folic acid 1 mg daily and prenatal vitamin.      Prior to Admission medications    Medication Sig Start Date End Date Taking? Authorizing Provider   folic acid (FOLVITE) 1 MG tablet Take 1 tablet (1,000 mcg) by mouth daily 2/5/18  Yes Cheryl Kumari MD   IBUPROFEN PO Take 800 mg by mouth every 6 hours as needed for moderate pain    Yes Reported, Patient   levETIRAcetam (KEPPRA) 1000 MG tablet Take 1 tablet (1,000 mg) by mouth 2 times daily *Please keep office visit on 4/8/2020* 3/5/20  Yes Cheryl Kumari MD   levETIRAcetam (KEPPRA) 250 MG tablet Take 1 tablet (250 mg) by mouth 2 times daily 4/8/19  Yes Cheryl Kumari MD   OXcarbazepine (TRILEPTAL) 600 MG tablet Take 1 tablet (600 mg) by mouth 2 times daily *Please keep office visit on 4/8/2020* 3/5/20  Yes Cheryl Kumari MD   LORazepam (LORAZEPAM INTENSOL) 2 MG/ML concentrated solution Administer 2 mg lorazepam between cheek and gum after a GTC seizure or 2 complex partial seizures in 24 hours.  Patient not taking: Reported on 2/5/2018 8/20/15   Cheryl Kumari MD        Perceived AED Side Effects:  No    Medication Notes:      AED Medication Compliance:  compliant most of the time    Review of Systems:  Lethargy / Tiredness:  No  Nausea / Vomiting:  No  Double Vision:  No  Sleepiness:  No  Depression:  No  Slowed Cognitive Function:  No  Memory Problems:  No  Poor Balance:  No  Dizziness:  No  Blurred Vision:  No  Respiratory: No shortness of breath and No  cough  Cardiovascular: negative  Have you experienced a traumatic fall since your last visit: No      Other Issues:    Is patient safe to drive:  yes    Physical exam:  Patient speaks fluently and coherently no aphasia or dysarthria, follows commands, extraocular movements are intact, no nystagmus, face is symmetric, smile is symmetric, moves arms spontaneously and equally, finger-to-nose is normal.    Assessment and plan:  Focal epilepsy: The patient had no seizures since last visit.  Video EEG monitoring in July 2015 showed seizures of left temporal origin. 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  The patient is taking levetiracetam 1250 mg twice daily and oxcarbazepine 600 mg twice daily.  She denies side effects.    - Continue her ASD is as before.  -Return to clinic in 1 year.    I spent approximately 10 minutes face-to-face with the patient; during which, I updated patient's medical information, discussed treatment plan and addressed patient's concerns.  Cheryl Kumari MD

## 2020-12-12 ENCOUNTER — HEALTH MAINTENANCE LETTER (OUTPATIENT)
Age: 25
End: 2020-12-12

## 2021-04-11 ENCOUNTER — HEALTH MAINTENANCE LETTER (OUTPATIENT)
Age: 26
End: 2021-04-11

## 2021-07-01 ENCOUNTER — VIRTUAL VISIT (OUTPATIENT)
Dept: NEUROLOGY | Facility: CLINIC | Age: 26
End: 2021-07-01

## 2021-07-01 DIAGNOSIS — G40.109 FOCAL EPILEPSY (H): Primary | ICD-10-CM

## 2021-07-01 DIAGNOSIS — G40.109 LOCALIZATION-RELATED EPILEPSY (H): ICD-10-CM

## 2021-07-01 RX ORDER — OXCARBAZEPINE 600 MG/1
600 TABLET, FILM COATED ORAL 2 TIMES DAILY
Qty: 180 TABLET | Refills: 3 | Status: SHIPPED | OUTPATIENT
Start: 2021-07-01 | End: 2022-02-25

## 2021-07-01 RX ORDER — LEVETIRACETAM 1000 MG/1
1000 TABLET ORAL 2 TIMES DAILY
Qty: 180 TABLET | Refills: 3 | Status: SHIPPED | OUTPATIENT
Start: 2021-07-01 | End: 2022-02-25

## 2021-07-01 RX ORDER — LEVETIRACETAM 250 MG/1
250 TABLET ORAL 2 TIMES DAILY
Qty: 180 TABLET | Refills: 3 | Status: SHIPPED | OUTPATIENT
Start: 2021-07-01 | End: 2022-02-25

## 2021-07-01 NOTE — LETTER
2021       RE: Nancy Anderson  : 1995   MRN: [id number]      Dear Colleague,    Thank you for referring your patient, Nancy Anderson, to the [facility] EPILEPSY CARE at [facility]. Please see a copy of my visit note below.    Nancy is a 26 year old who is being evaluated via a billable video visit.      How would you like to obtain your AVS? MyChart  If the video visit is dropped, the invitation should be resent by: Text to cell phone: [phone]  Will anyone else be joining your video visit? No    Video Start Time: 9:02    Video-Visit Details    Type of service:  Video Visit    Video End Time: 9:12  Originating Location (pt. Location): Home    Distant Location (provider location):  [facility] EPILEPSY CARE     Platform used for Video Visit: [facility] Epilepsy Care Progress Note      Patient:  Nancy Anderson  :  1995   Age:  26 year old   Today's Virtual Visit:  2021    Epilepsy Data:   The patient's seizures started in  when she was 17 years old. She was traveling with her basketball team and she had a seizure. She had no warning with that. She had loss of consciousness and per description she had a generalized tonic-clonic seizure with foaming at mouth and tongue biting. She had 3 of those seizures in 1 day 2 hours apart. Mom witnessed the last one, in which she shook all over and tilted her head and her face turned purple. These happen rarely since she started on Keppra, and they only happen when she misses her medication. The last one was 2014. The patient describes she has no warning. Her mom has noted that she turns her head but does not remember which side. She stiffens up and shakes all over. She has frothing at the mouth, tongue biting and urinary incontinence. They usually last 1-2 minutes. Her eyes roll back. It takes about 15 minutes for her to come back to baseline, then she has a headache and is very sleepy.  "     She has another type of spell which started when she was 11 years old. She calls them dizzy spells. They have never been diagnosed as seizures. These never changed on Keppra. With those, she gets a feeling of disorientation and feeling dizzy. She says sometimes shegets spinning sensation and sometimes lightheadedness and then she loses consciousness. Mom says she will stare into space and says \"yup, yup\". She may blink. She may fumble with her hands. She also will turn her head but not sure which side and is unresponsive. She is amnestic about the event. These currently happen 5 times a month. It depends on her activity. The frequency varies. These started when she was playing sports, and they could have happened 3-4 times a week when she was very active.   Triggers for her seizures are lack of sleep, exercise. With many of her seizures, when the blood sugar was checked, it was in the 50s, so mom thinks that not eating well is a trigger for her seizures, but she does not think so.     RISK FACTORS FOR EPILEPSY: Mom denies gestational or  complications. She was born 2 weeks early and weighed 9 pounds 14 ounces. She stayed in the hospital for 2 days for jaundice. She denies meningitis, encephalitis or febrile seizures. No family history of epilepsy. When she was between age 9-12, she used to hit her head when she was getting angry, but she never lost consciousness, and there was not a significant head injury that they are aware of.         History of Present Illness:    Nancy is participating in this virtual visit for follow-up on her epilepsy.  She was last seen on 2020.  She did not have any seizures since last visit.    She is taking levetiracetam 1250 mg twice a day and oxcarbazepine 600 mg twice a day.  She denies dizziness, unsteadiness, mood swings or irritability.     Other issues: She is sexually active but doesn't plan to get pregnant in the near future. She is taking folic acid 1 mg " daily.     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      Labs were reviewed as below.  Results for SANTOS COONEY (MRN [id number]) as of 7/1/2021 09:23   Ref. Range 4/8/2019 09:58   Keppra (Levetiracetam) Level Latest Ref Range: 12 - 46 ug/mL 35   10 Hydroxy Metabolite Level Latest Ref Range: 10.0 - 35.0 ug/ml 18.2         Current Outpatient Medications   Medication Sig Dispense Refill     folic acid (FOLVITE) 1 MG tablet Take 1 tablet (1,000 mcg) by mouth daily 90 tablet 3     IBUPROFEN PO Take 800 mg by mouth every 6 hours as needed for moderate pain        levETIRAcetam (KEPPRA) 1000 MG tablet Take 1 tablet (1,000 mg) by mouth 2 times daily *Please keep office visit on 4/8/2020* 180 tablet 3     levETIRAcetam (KEPPRA) 250 MG tablet Take 1 tablet (250 mg) by mouth 2 times daily 180 tablet 3     OXcarbazepine (TRILEPTAL) 600 MG tablet Take 1 tablet (600 mg) by mouth 2 times daily *Please keep office visit on 4/8/2020* 180 tablet 3     LORazepam (LORAZEPAM INTENSOL) 2 MG/ML concentrated solution Administer 2 mg lorazepam between cheek and gum after a GTC seizure or 2 complex partial seizures in 24 hours. (Patient not taking: Reported on 2/5/2018) 30 mL 0      Perceived AED Side Effects:  No    Medication Notes:        AED Medication Compliance:  compliant most of the time    Other Issues:    Is patient safe to drive:  Yes    Woman Care:   Patient is:    Sexually Active:  Yes  Pregnant:  no.  Planning to become pregnant:  No    Exam:    Wt Readings from Last 5 Encounters:   04/08/19 189 lb 3.2 oz (85.8 kg)   02/05/18 187 lb 12.8 oz (85.2 kg)   03/17/16 178 lb 14.4 oz (81.1 kg)   08/20/15 193 lb (87.5 kg)   07/22/15 191 lb 3.2 oz (86.7 kg)     Alert, awake, cooperative and pleasant, NAD, no aphasia or dysarthria, EOMI, face is symmetric, moves upper extremities against gravity, normal finger-to-nose, no dysmetria or tremors.    Assessment and Plan:   1. Focal  epilepsy: The patient had no seizures since last visit.  Video EEG monitoring in July 2015 showed seizures out of left temporal region. 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  The patient is taking levetiracetam 1250 mg twice daily and oxcarbazepine 600 mg twice daily.  She denies side effects.    2.  Women and epilepsy: Patient is sexually active, but does not plan to get pregnant anytime soon.  She is taking folic acid 1 mg daily.  She was advised to increase her folic acid to 1 mg twice a day if she decided to get pregnant and inform me of her pregnancy to monitor her and check her ASD levels monthly.     -Continue her ASDs as before.  -Obtain ASD levels and sodium for possible hyponatremia  - Continue folic acid 1 mg daily  -Return to clinic in 1 year.       As described above, I met with the patient for 10 minutes and during this time counseling was greater than 50% of the visit time.  I spent an additional 10 minutes on reviewing patient's history, blood work and documentation.   Cheryl Kumari MD                            Again, thank you for allowing me to participate in the care of your patient.      Sincerely,    Cheryl Kumari MD

## 2021-07-01 NOTE — PROGRESS NOTES
Nancy is a 26 year old who is being evaluated via a billable video visit.      How would you like to obtain your AVS? MyChart  If the video visit is dropped, the invitation should be resent by: Text to cell phone: [phone]  Will anyone else be joining your video visit? No    Video Start Time: 9:02    Video-Visit Details    Type of service:  Video Visit    Video End Time: 9:12  Originating Location (pt. Location): Home    Distant Location (provider location):  [facility] EPILEPSY CARE     Platform used for Video Visit: [employer]/[facility] Epilepsy Care Progress Note      Patient:  Nancy Anderson  :  1995   Age:  26 year old   Today's Virtual Visit:  2021    Epilepsy Data:   The patient's seizures started in  when she was 17 years old. She was traveling with her basketball team and she had a seizure. She had no warning with that. She had loss of consciousness and per description she had a generalized tonic-clonic seizure with foaming at mouth and tongue biting. She had 3 of those seizures in 1 day 2 hours apart. Mom witnessed the last one, in which she shook all over and tilted her head and her face turned purple. These happen rarely since she started on Keppra, and they only happen when she misses her medication. The last one was 2014. The patient describes she has no warning. Her mom has noted that she turns her head but does not remember which side. She stiffens up and shakes all over. She has frothing at the mouth, tongue biting and urinary incontinence. They usually last 1-2 minutes. Her eyes roll back. It takes about 15 minutes for her to come back to baseline, then she has a headache and is very sleepy.      She has another type of spell which started when she was 11 years old. She calls them dizzy spells. They have never been diagnosed as seizures. These never changed on Keppra. With those, she gets a feeling of disorientation and feeling dizzy. She says sometimes shegets  "spinning sensation and sometimes lightheadedness and then she loses consciousness. Mom says she will stare into space and says \"yup, yup\". She may blink. She may fumble with her hands. She also will turn her head but not sure which side and is unresponsive. She is amnestic about the event. These currently happen 5 times a month. It depends on her activity. The frequency varies. These started when she was playing sports, and they could have happened 3-4 times a week when she was very active.   Triggers for her seizures are lack of sleep, exercise. With many of her seizures, when the blood sugar was checked, it was in the 50s, so mom thinks that not eating well is a trigger for her seizures, but she does not think so.     RISK FACTORS FOR EPILEPSY: Mom denies gestational or  complications. She was born 2 weeks early and weighed 9 pounds 14 ounces. She stayed in the hospital for 2 days for jaundice. She denies meningitis, encephalitis or febrile seizures. No family history of epilepsy. When she was between age 9-12, she used to hit her head when she was getting angry, but she never lost consciousness, and there was not a significant head injury that they are aware of.         History of Present Illness:    Santos is participating in this virtual visit for follow-up on her epilepsy.  She was last seen on 2020.  She did not have any seizures since last visit.    She is taking levetiracetam 1250 mg twice a day and oxcarbazepine 600 mg twice a day.  She denies dizziness, unsteadiness, mood swings or irritability.     Other issues: She is sexually active but doesn't plan to get pregnant in the near future. She is taking folic acid 1 mg daily.     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      Labs were reviewed as below.  Results for SANTOS COONEY (MRN [id number]) as of 2021 09:23   Ref. Range 2019 09:58   Keppra (Levetiracetam) Level Latest Ref " Range: 12 - 46 ug/mL 35   10 Hydroxy Metabolite Level Latest Ref Range: 10.0 - 35.0 ug/ml 18.2         Current Outpatient Medications   Medication Sig Dispense Refill     folic acid (FOLVITE) 1 MG tablet Take 1 tablet (1,000 mcg) by mouth daily 90 tablet 3     IBUPROFEN PO Take 800 mg by mouth every 6 hours as needed for moderate pain        levETIRAcetam (KEPPRA) 1000 MG tablet Take 1 tablet (1,000 mg) by mouth 2 times daily *Please keep office visit on 4/8/2020* 180 tablet 3     levETIRAcetam (KEPPRA) 250 MG tablet Take 1 tablet (250 mg) by mouth 2 times daily 180 tablet 3     OXcarbazepine (TRILEPTAL) 600 MG tablet Take 1 tablet (600 mg) by mouth 2 times daily *Please keep office visit on 4/8/2020* 180 tablet 3     LORazepam (LORAZEPAM INTENSOL) 2 MG/ML concentrated solution Administer 2 mg lorazepam between cheek and gum after a GTC seizure or 2 complex partial seizures in 24 hours. (Patient not taking: Reported on 2/5/2018) 30 mL 0      Perceived AED Side Effects:  No    Medication Notes:        AED Medication Compliance:  compliant most of the time    Other Issues:    Is patient safe to drive:  Yes    Woman Care:   Patient is:    Sexually Active:  Yes  Pregnant:  no.  Planning to become pregnant:  No    Exam:    Wt Readings from Last 5 Encounters:   04/08/19 189 lb 3.2 oz (85.8 kg)   02/05/18 187 lb 12.8 oz (85.2 kg)   03/17/16 178 lb 14.4 oz (81.1 kg)   08/20/15 193 lb (87.5 kg)   07/22/15 191 lb 3.2 oz (86.7 kg)     Alert, awake, cooperative and pleasant, NAD, no aphasia or dysarthria, EOMI, face is symmetric, moves upper extremities against gravity, normal finger-to-nose, no dysmetria or tremors.    Assessment and Plan:   1. Focal epilepsy: The patient had no seizures since last visit.  Video EEG monitoring in July 2015 showed seizures out of left temporal region. 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  The patient is taking levetiracetam 1250 mg twice daily and oxcarbazepine 600 mg  twice daily.  She denies side effects.    2.  Women and epilepsy: Patient is sexually active, but does not plan to get pregnant anytime soon.  She is taking folic acid 1 mg daily.  She was advised to increase her folic acid to 1 mg twice a day if she decided to get pregnant and inform me of her pregnancy to monitor her and check her ASD levels monthly.     -Continue her ASDs as before.  -Obtain ASD levels and sodium for possible hyponatremia  - Continue folic acid 1 mg daily  -Return to clinic in 1 year.       As described above, I met with the patient for 10 minutes and during this time counseling was greater than 50% of the visit time.  I spent an additional 10 minutes on reviewing patient's history, blood work and documentation.   Cheryl Kumari MD

## 2021-09-26 ENCOUNTER — HEALTH MAINTENANCE LETTER (OUTPATIENT)
Age: 26
End: 2021-09-26

## 2021-12-16 ENCOUNTER — TELEPHONE (OUTPATIENT)
Dept: NEUROLOGY | Facility: CLINIC | Age: 26
End: 2021-12-16

## 2021-12-16 NOTE — TELEPHONE ENCOUNTER
Patient called office to say she is faxing in a medical clearance form she needs completed by Dr. Reynaga. Patient is hoping this can be completed asap

## 2022-02-25 ENCOUNTER — VIRTUAL VISIT (OUTPATIENT)
Dept: NEUROLOGY | Facility: CLINIC | Age: 27
End: 2022-02-25
Payer: COMMERCIAL

## 2022-02-25 DIAGNOSIS — G40.109 LOCALIZATION-RELATED EPILEPSY (H): ICD-10-CM

## 2022-02-25 DIAGNOSIS — R51.9 NEW ONSET HEADACHE: Primary | ICD-10-CM

## 2022-02-25 PROCEDURE — 99213 OFFICE O/P EST LOW 20 MIN: CPT | Mod: 95 | Performed by: PSYCHIATRY & NEUROLOGY

## 2022-02-25 RX ORDER — LEVETIRACETAM 250 MG/1
250 TABLET ORAL 2 TIMES DAILY
Qty: 180 TABLET | Refills: 3 | Status: SHIPPED | OUTPATIENT
Start: 2022-02-25 | End: 2023-03-02

## 2022-02-25 RX ORDER — FOLIC ACID 1 MG/1
1000 TABLET ORAL DAILY
Qty: 90 TABLET | Refills: 3 | Status: SHIPPED | OUTPATIENT
Start: 2022-02-25

## 2022-02-25 RX ORDER — OXCARBAZEPINE 600 MG/1
600 TABLET, FILM COATED ORAL 2 TIMES DAILY
Qty: 180 TABLET | Refills: 3 | Status: SHIPPED | OUTPATIENT
Start: 2022-02-25 | End: 2023-03-02

## 2022-02-25 RX ORDER — LEVETIRACETAM 1000 MG/1
1000 TABLET ORAL 2 TIMES DAILY
Qty: 180 TABLET | Refills: 3 | Status: SHIPPED | OUTPATIENT
Start: 2022-02-25 | End: 2023-03-02

## 2022-02-25 NOTE — LETTER
2022         RE: Nancy Anderson  [address]  [place] [address]        Dear Colleague,    Thank you for referring your patient, Nancy Anderson, to the [facility] [place]. Please see a copy of my visit note below.    Nancy is a 26 year old who is being evaluated via a billable video visit.      How would you like to obtain your AVS? MyChart  If the video visit is dropped, the invitation should be resent by: Send to e-mail at: [email or web address]  Will anyone else be joining your video visit? No    Video Start Time: 8:12  Video-Visit Details    Type of service:  Video Visit    Video End Time: 8:24    Originating Location (pt. Location): Home    Distant Location (provider location):  [facility] [place]     Platform used for Video Visit: [employer]     [facility] Epilepsy Care Progress Note      Patient:  Nancy Anderson  :  1995   Age:  26 year old   Today's Virtual Visit:  2022    Epilepsy Data:   The patient's seizures started in  when she was 17 years old. She was traveling with her basketball team and she had a seizure. She had no warning with that. She had loss of consciousness and per description she had a generalized tonic-clonic seizure with foaming at mouth and tongue biting. She had 3 of those seizures in 1 day 2 hours apart. Mom witnessed the last one, in which she shook all over and tilted her head and her face turned purple. These happen rarely since she started on Keppra, and they only happen when she misses her medication. The last one was 2014. The patient describes she has no warning. Her mom has noted that she turns her head but does not remember which side. She stiffens up and shakes all over. She has frothing at the mouth, tongue biting and urinary incontinence. They usually last 1-2 minutes. Her eyes roll back. It takes about 15 minutes for her to come back to baseline, then she has a  "headache and is very sleepy.      She has another type of spell which started when she was 11 years old. She calls them dizzy spells. They have never been diagnosed as seizures. These never changed on Keppra. With these, she gets a feeling of disorientation and feeling dizzy. She says sometimes shegets spinning sensation and sometimes lightheadedness and then she loses consciousness. Mom says she will stare into space and says \"yup, yup\". She may blink. She may fumble with her hands. She also will turn her head but not sure which side and is unresponsive. She is amnestic about the event. These currently happen 5 times a month. It depends on her activity. The frequency varies. These started when she was playing sports, and they could have happened 3-4 times a week when she was very active.   Triggers for her seizures are lack of sleep, exercise. With many of her seizures, when the blood sugar was checked, it was in the 50s, so mom thinks that not eating well is a trigger for her seizures, but she does not think so.      RISK FACTORS FOR EPILEPSY: No gestational or  complications. She was born 2 weeks early and weighed 9 pounds 14 ounces. She stayed in the hospital for 2 days for jaundice. She denies meningitis, encephalitis or febrile seizures. No family history of epilepsy. When she was between age 10-12, she used to hit her head when she was getting angry, but she never lost consciousness, and there was not a significant head injury that they are aware of.         History of Present Illness:    Ms. Cruz is participating in this virtual visit for a follow up on her epilepsy.  She had no seizures since last visit 2021.  She is taking LVT 1250 mg bid and  mg bid. She denies side effects.     She has a new onset headache.  It has been a constant headache, on the right side of her head, started almost a month ago. She got Covid in Dec 2021.  It feels pressure.  Severity has been up to 7 in scale of " 1-10, no nausea, vomiting, or vision changes.  She usually takes a Tylenol and it goes away. It happens usually before she goes to work every day.     She is a traveling nurse now, has been working a lot. Has a lot of anxiety.     She tries to get hydrated and her headaches are geting better.       Current Outpatient Medications   Medication Sig Dispense Refill     folic acid (FOLVITE) 1 MG tablet Take 1 tablet (1,000 mcg) by mouth daily 90 tablet 3     IBUPROFEN PO Take 800 mg by mouth every 6 hours as needed for moderate pain        levETIRAcetam (KEPPRA) 1000 MG tablet Take 1 tablet (1,000 mg) by mouth 2 times daily 180 tablet 3     levETIRAcetam (KEPPRA) 250 MG tablet Take 1 tablet (250 mg) by mouth 2 times daily 180 tablet 3     LORazepam (LORAZEPAM INTENSOL) 2 MG/ML concentrated solution Administer 2 mg lorazepam between cheek and gum after a GTC seizure or 2 complex partial seizures in 24 hours. 30 mL 0     OXcarbazepine (TRILEPTAL) 600 MG tablet Take 1 tablet (600 mg) by mouth 2 times daily 180 tablet 3      Perceived AED Side Effects:  None    Medication Notes:        AED Medication Compliance:  compliant most of the time    Other Issues:    Is patient safe to drive:  Yes    Woman Care:   Other issues: She is sexually active but is not planning to get pregnant in the near future. She is taking folic acid 1 mg daily.     Exam:    Wt Readings from Last 5 Encounters:   04/08/19 85.8 kg (189 lb 3.2 oz)   02/05/18 85.2 kg (187 lb 12.8 oz)   03/17/16 81.1 kg (178 lb 14.4 oz)   08/20/15 87.5 kg (193 lb)   07/22/15 86.7 kg (191 lb 3.2 oz)     Alert, awake, NAD, EOMI, face symmetric, moves upper extremities against gravity, no dysmetria or tremors.     Assessment and Plan:    1. Focal epilepsy: The patient had no seizures since last visit.  Video EEG monitoring in July 2015 showed seizures out of left temporal region. 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  The patient is taking levetiracetam  1250 mg twice daily and oxcarbazepine 600 mg twice daily.  She denies side effects.    2. Headches: Constant headache on the right side of head, not throbbing, no nausea or vision changes, started about a month ago, and has been stable. Of note, she got Covid in December, which might be the cause. Patient is very concerned, so I'm going to order a head CT.  They usually improve with a Tylenol, so she doesn't want to take any other medications.     - Continue levetiracetam 1250 mg twice a day and oxcarbazepine 600 mg twice a day.     - Obtain ASD levels and Na    - Head CT wo contrast    - Continue folic acid 1 mg daily    - Follow up in 6 months            As described above, I met with the patient for 12 minutes and during this time counseling was within 50% of the visit time.  Cheryl Kumari MD                          Again, thank you for allowing me to participate in the care of your patient.        Sincerely,        Cheryl Kumari MD

## 2022-02-25 NOTE — LETTER
Date:March 3, 2022      Patient was self referred, no letter generated. Do not send.        [facility] Health Information

## 2022-02-25 NOTE — PROGRESS NOTES
Nancy is a 26 year old who is being evaluated via a billable video visit.      How would you like to obtain your AVS? MyChart  If the video visit is dropped, the invitation should be resent by: Send to e-mail at: [email or web address]  Will anyone else be joining your video visit? No    Video Start Time: 8:12  Video-Visit Details    Type of service:  Video Visit    Video End Time: 8:24    Originating Location (pt. Location): Home    Distant Location (provider location):  [facility] [place]     Platform used for Video Visit: [employer] [place] Epilepsy Care Progress Note      Patient:  Nancy Anderson  :  1995   Age:  26 year old   Today's Virtual Visit:  2022    Epilepsy Data:   The patient's seizures started in  when she was 17 years old. She was traveling with her basketball team and she had a seizure. She had no warning with that. She had loss of consciousness and per description she had a generalized tonic-clonic seizure with foaming at mouth and tongue biting. She had 3 of those seizures in 1 day 2 hours apart. Mom witnessed the last one, in which she shook all over and tilted her head and her face turned purple. These happen rarely since she started on Keppra, and they only happen when she misses her medication. The last one was 2014. The patient describes she has no warning. Her mom has noted that she turns her head but does not remember which side. She stiffens up and shakes all over. She has frothing at the mouth, tongue biting and urinary incontinence. They usually last 1-2 minutes. Her eyes roll back. It takes about 15 minutes for her to come back to baseline, then she has a headache and is very sleepy.      She has another type of spell which started when she was 11 years old. She calls them dizzy spells. They have never been diagnosed as seizures. These never changed on Keppra. With these, she gets a feeling of disorientation and feeling  "dizzy. She says sometimes shegets spinning sensation and sometimes lightheadedness and then she loses consciousness. Mom says she will stare into space and says \"yup, yup\". She may blink. She may fumble with her hands. She also will turn her head but not sure which side and is unresponsive. She is amnestic about the event. These currently happen 5 times a month. It depends on her activity. The frequency varies. These started when she was playing sports, and they could have happened 3-4 times a week when she was very active.   Triggers for her seizures are lack of sleep, exercise. With many of her seizures, when the blood sugar was checked, it was in the 50s, so mom thinks that not eating well is a trigger for her seizures, but she does not think so.      RISK FACTORS FOR EPILEPSY: No gestational or  complications. She was born 2 weeks early and weighed 9 pounds 14 ounces. She stayed in the hospital for 2 days for jaundice. She denies meningitis, encephalitis or febrile seizures. No family history of epilepsy. When she was between age [age], she used to hit her head when she was getting angry, but she never lost consciousness, and there was not a significant head injury that they are aware of.         History of Present Illness:    Ms. Cruz is participating in this virtual visit for a follow up on her epilepsy.  She had no seizures since last visit 2021.  She is taking LVT 1250 mg bid and  mg bid. She denies side effects.     She has a new onset headache.  It has been a constant headache, on the right side of her head, started almost a month ago. She got Covid in Dec 2021.  It feels pressure.  Severity has been up to 7 in scale of 1-10, no nausea, vomiting, or vision changes.  She usually takes a Tylenol and it goes away. It happens usually before she goes to work every day.     She is a traveling nurse now, has been working a lot. Has a lot of anxiety.     She tries to get hydrated and her " headaches are geting better.       Current Outpatient Medications   Medication Sig Dispense Refill     folic acid (FOLVITE) 1 MG tablet Take 1 tablet (1,000 mcg) by mouth daily 90 tablet 3     IBUPROFEN PO Take 800 mg by mouth every 6 hours as needed for moderate pain        levETIRAcetam (KEPPRA) 1000 MG tablet Take 1 tablet (1,000 mg) by mouth 2 times daily 180 tablet 3     levETIRAcetam (KEPPRA) 250 MG tablet Take 1 tablet (250 mg) by mouth 2 times daily 180 tablet 3     LORazepam (LORAZEPAM INTENSOL) 2 MG/ML concentrated solution Administer 2 mg lorazepam between cheek and gum after a GTC seizure or 2 complex partial seizures in 24 hours. 30 mL 0     OXcarbazepine (TRILEPTAL) 600 MG tablet Take 1 tablet (600 mg) by mouth 2 times daily 180 tablet 3      Perceived AED Side Effects:  None    Medication Notes:        AED Medication Compliance:  compliant most of the time    Other Issues:    Is patient safe to drive:  Yes    Woman Care:   Other issues: She is sexually active but is not planning to get pregnant in the near future. She is taking folic acid 1 mg daily.     Exam:    Wt Readings from Last 5 Encounters:   04/08/19 85.8 kg (189 lb 3.2 oz)   02/05/18 85.2 kg (187 lb 12.8 oz)   03/17/16 81.1 kg (178 lb 14.4 oz)   08/20/15 87.5 kg (193 lb)   07/22/15 86.7 kg (191 lb 3.2 oz)     Alert, awake, NAD, EOMI, face symmetric, moves upper extremities against gravity, no dysmetria or tremors.     Assessment and Plan:    1. Focal epilepsy: The patient had no seizures since last visit.  Video EEG monitoring in July 2015 showed seizures out of left temporal region. 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  The patient is taking levetiracetam 1250 mg twice daily and oxcarbazepine 600 mg twice daily.  She denies side effects.    2. Headches: Constant headache on the right side of head, not throbbing, no nausea or vision changes, started about a month ago, and has been stable. Of note, she got Covid in  December, which might be the cause. Patient is very concerned, so I'm going to order a head CT.  They usually improve with a Tylenol, so she doesn't want to take any other medications.     - Continue levetiracetam 1250 mg twice a day and oxcarbazepine 600 mg twice a day.     - Obtain ASD levels and Na    - Head CT wo contrast    - Continue folic acid 1 mg daily    - Follow up in 6 months            As described above, I met with the patient for 12 minutes and during this time counseling was within 50% of the visit time.  Cheryl Kumari MD

## 2022-05-08 ENCOUNTER — HEALTH MAINTENANCE LETTER (OUTPATIENT)
Age: 27
End: 2022-05-08

## 2022-09-23 ENCOUNTER — VIRTUAL VISIT (OUTPATIENT)
Dept: NEUROLOGY | Facility: CLINIC | Age: 27
End: 2022-09-23
Payer: COMMERCIAL

## 2022-09-23 DIAGNOSIS — G44.209 TENSION HEADACHE: Primary | ICD-10-CM

## 2022-09-23 PROCEDURE — 99213 OFFICE O/P EST LOW 20 MIN: CPT | Mod: 95 | Performed by: PSYCHIATRY & NEUROLOGY

## 2022-09-23 RX ORDER — METHYLPREDNISOLONE 4 MG
TABLET, DOSE PACK ORAL
Qty: 21 TABLET | Refills: 0 | Status: SHIPPED | OUTPATIENT
Start: 2022-09-23 | End: 2023-01-27

## 2022-09-23 NOTE — LETTER
2022         RE: Nancy Anderson  [address]  [place] [address]        Dear Colleague,    Thank you for referring your patient, Nancy Anderson, to the [facility] [place]. Please see a copy of my visit note below.    Nancy is a 27 year old who is being evaluated via a billable video visit.      How would you like to obtain your AVS? MyChart  If the video visit is dropped, the invitation should be resent by: Send to e-mail at: [email or web address]  Will anyone else be joining your video visit? No        Video-Visit Details    Video Start Time:     Type of service:  Video Visit    Video End Time:    Originating Location (pt. Location):     Distant Location (provider location):  [facility] [place]     Platform used for Video Visit:   CHELSIE Waller, DENITA ([facility])         NEUROLOGY PROGRESS NOTE   [facility]    Patient:Nancy Anderson  : 1995  Age: 27 year old  Today's Virtual Visit: 2022    Epilepsy Data:   The patient's seizures started in  when she was 17 years old. She was traveling with her basketball team and she had a seizure. She had no warning with that. She had loss of consciousness and per description she had a generalized tonic-clonic seizure with foaming at mouth and tongue biting. She had 3 of those seizures in 1 day 2 hours apart. Mom witnessed the last one, in which she shook all over and tilted her head and her face turned purple. These happen rarely since she started on Keppra, and they only happen when she misses her medication. The last one was 2014. The patient describes she has no warning. Her mom has noted that she turns her head but does not remember which side. She stiffens up and shakes all over. She has frothing at the mouth, tongue biting and urinary incontinence. They usually last 1-2 minutes. Her eyes roll back. It takes about 15 minutes for her to come back to baseline, then she  "has a headache and is very sleepy.      She has another type of spell which started when she was 11 years old. She calls them dizzy spells. They have never been diagnosed as seizures. These never changed on Keppra. With these, she gets a feeling of disorientation and feeling dizzy. She says sometimes shegets spinning sensation and sometimes lightheadedness and then she loses consciousness. Mom says she will stare into space and says \"yup, yup\". She may blink. She may fumble with her hands. She also will turn her head but not sure which side and is unresponsive. She is amnestic about the event. These currently happen 5 times a month. It depends on her activity. The frequency varies. These started when she was playing sports, and they could have happened 3-4 times a week when she was very active.   Triggers for her seizures are lack of sleep, exercise. With many of her seizures, when the blood sugar was checked, it was in the 50s, so mom thinks that not eating well is a trigger for her seizures, but she does not think so.      RISK FACTORS FOR EPILEPSY: No gestational or  complications. She was born 2 weeks early and weighed 9 pounds 14 ounces. She stayed in the hospital for 2 days for jaundice. She denies meningitis, encephalitis or febrile seizures. No family history of epilepsy. When she was between age [age], she used to hit her head when she was getting angry, but she never lost consciousness, and there was not a significant head injury that they are aware of.        History of present illness:     Ms. Nancy Anderson is participating in this virtual visit for follow-up on her epilepsy and headaches.  She was last seen 2022.  She did not have any seizures since last visit.  She is taking levetiracetam 1250 mg twice daily and oxcarbazepine 600 mg twice daily.  She denies side effects.    She complained of constant headache in the last visit.  She still has headache. She feels pressure in front of " her head. Denies vision changes, N/V; sometimes gets dizzy.  When she works a lot, the pain goes up to 7 in scale of 1 -10.  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      Current Outpatient Medications   Medication Sig Dispense Refill     folic acid (FOLVITE) 1 MG tablet Take 1 tablet (1,000 mcg) by mouth daily 90 tablet 3     IBUPROFEN PO Take 800 mg by mouth every 6 hours as needed for moderate pain        levETIRAcetam (KEPPRA) 1000 MG tablet Take 1 tablet (1,000 mg) by mouth 2 times daily 180 tablet 3     levETIRAcetam (KEPPRA) 250 MG tablet Take 1 tablet (250 mg) by mouth 2 times daily 180 tablet 3     LORazepam (LORAZEPAM INTENSOL) 2 MG/ML concentrated solution Administer 2 mg lorazepam between cheek and gum after a GTC seizure or 2 complex partial seizures in 24 hours. 30 mL 0     OXcarbazepine (TRILEPTAL) 600 MG tablet Take 1 tablet (600 mg) by mouth 2 times daily 180 tablet 3     Exam:    Wt Readings from Last 5 Encounters:   04/08/19 85.8 kg (189 lb 3.2 oz)   02/05/18 85.2 kg (187 lb 12.8 oz)   03/17/16 81.1 kg (178 lb 14.4 oz)   08/20/15 87.5 kg (193 lb)   07/22/15 86.7 kg (191 lb 3.2 oz)     Alert, awake, NAD, no aphasia or dysarthria, EOMI, face symmetric, moves upper extremities against gravity.     Assessment/Plan:     1. Focal epilepsy: The patient had no seizures since last visit.  Video EEG monitoring in July 2015 showed seizures out of left temporal region. 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  The patient is taking levetiracetam 1250 mg twice daily and oxcarbazepine 600 mg twice daily.  She denies side effects.     2. Headches: she has been having a constant headache in front of her head for awhile. I discussed taking medrol Dosepak to stop current headache.      - Continue levetiracetam 1250 mg twice a day and oxcarbazepine 600 mg twice a day.      - Take medrol Dosepak as instructed       - Follow up in 4 months      As described above, I met with the patient for 8 minutes (8:37-8:46) and during this time counseling was greater than 50% of the visit time.  Cheryl Kumari MD        Again, thank you for allowing me to participate in the care of your patient.        Sincerely,        Cheryl Kumari MD

## 2022-09-23 NOTE — PROGRESS NOTES
" NEUROLOGY PROGRESS NOTE   [facility]    Patient:Nancy Anderson  : 1995  Age: 27 year old  Today's Virtual Visit: 2022    Epilepsy Data:   The patient's seizures started in  when she was 17 years old. She was traveling with her basketball team and she had a seizure. She had no warning with that. She had loss of consciousness and per description she had a generalized tonic-clonic seizure with foaming at mouth and tongue biting. She had 3 of those seizures in 1 day 2 hours apart. Mom witnessed the last one, in which she shook all over and tilted her head and her face turned purple. These happen rarely since she started on Keppra, and they only happen when she misses her medication. The last one was 2014. The patient describes she has no warning. Her mom has noted that she turns her head but does not remember which side. She stiffens up and shakes all over. She has frothing at the mouth, tongue biting and urinary incontinence. They usually last 1-2 minutes. Her eyes roll back. It takes about 15 minutes for her to come back to baseline, then she has a headache and is very sleepy.      She has another type of spell which started when she was 11 years old. She calls them dizzy spells. They have never been diagnosed as seizures. These never changed on Keppra. With these, she gets a feeling of disorientation and feeling dizzy. She says sometimes shegets spinning sensation and sometimes lightheadedness and then she loses consciousness. Mom says she will stare into space and says \"yup, yup\". She may blink. She may fumble with her hands. She also will turn her head but not sure which side and is unresponsive. She is amnestic about the event. These currently happen 5 times a month. It depends on her activity. The frequency varies. These started when she was playing sports, and they could have happened 3-4 times a week when she was very active.   Triggers for her seizures are lack of sleep, " exercise. With many of her seizures, when the blood sugar was checked, it was in the 50s, so mom thinks that not eating well is a trigger for her seizures, but she does not think so.      RISK FACTORS FOR EPILEPSY: No gestational or  complications. She was born 2 weeks early and weighed 9 pounds 14 ounces. She stayed in the hospital for 2 days for jaundice. She denies meningitis, encephalitis or febrile seizures. No family history of epilepsy. When she was between age 10-12, she used to hit her head when she was getting angry, but she never lost consciousness, and there was not a significant head injury that they are aware of.        History of present illness:     Ms. Nancy Anderson is participating in this virtual visit for follow-up on her epilepsy and headaches.  She was last seen 2022.  She did not have any seizures since last visit.  She is taking levetiracetam 1250 mg twice daily and oxcarbazepine 600 mg twice daily.  She denies side effects.    She complained of constant headache in the last visit.  She still has headache. She feels pressure in front of her head. Denies vision changes, N/V; sometimes gets dizzy.  When she works a lot, the pain goes up to 7 in scale of 1 -10.  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      Current Outpatient Medications   Medication Sig Dispense Refill     folic acid (FOLVITE) 1 MG tablet Take 1 tablet (1,000 mcg) by mouth daily 90 tablet 3     IBUPROFEN PO Take 800 mg by mouth every 6 hours as needed for moderate pain        levETIRAcetam (KEPPRA) 1000 MG tablet Take 1 tablet (1,000 mg) by mouth 2 times daily 180 tablet 3     levETIRAcetam (KEPPRA) 250 MG tablet Take 1 tablet (250 mg) by mouth 2 times daily 180 tablet 3     LORazepam (LORAZEPAM INTENSOL) 2 MG/ML concentrated solution Administer 2 mg lorazepam between cheek and gum after a GTC seizure or 2 complex  partial seizures in 24 hours. 30 mL 0     OXcarbazepine (TRILEPTAL) 600 MG tablet Take 1 tablet (600 mg) by mouth 2 times daily 180 tablet 3     Exam:    Wt Readings from Last 5 Encounters:   04/08/19 85.8 kg (189 lb 3.2 oz)   02/05/18 85.2 kg (187 lb 12.8 oz)   03/17/16 81.1 kg (178 lb 14.4 oz)   08/20/15 87.5 kg (193 lb)   07/22/15 86.7 kg (191 lb 3.2 oz)     Alert, awake, NAD, no aphasia or dysarthria, EOMI, face symmetric, moves upper extremities against gravity.     Assessment/Plan:     1. Focal epilepsy: The patient had no seizures since last visit.  Video EEG monitoring in July 2015 showed seizures out of left temporal region. 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  The patient is taking levetiracetam 1250 mg twice daily and oxcarbazepine 600 mg twice daily.  She denies side effects.     2. Headches: she has been having a constant headache in front of her head for awhile. I discussed taking medrol Dosepak to stop current headache.      - Continue levetiracetam 1250 mg twice a day and oxcarbazepine 600 mg twice a day.      - Take medrol Dosepak as instructed      - Follow up in 4 months      As described above, I met with the patient for 8 minutes (8:37-8:46) and during this time counseling was greater than 50% of the visit time.  Cheryl Kumari MD

## 2022-09-23 NOTE — LETTER
Date:September 29, 2022      Patient was self referred, no letter generated. Do not send.        [facility] Information

## 2022-09-23 NOTE — PROGRESS NOTES
Nancy is a 27 year old who is being evaluated via a billable video visit.      How would you like to obtain your AVS? MyChart  If the video visit is dropped, the invitation should be resent by: Send to e-mail at: [email or web address]  Will anyone else be joining your video visit? No        Video-Visit Details    Video Start Time:     Type of service:  Video Visit    Video End Time:    Originating Location (pt. Location):     Distant Location (provider location):  [facility] NEUROLOGY CLINIC [place]     Platform used for Video Visit:   CHELSIE Waller CMA ([facility])

## 2022-10-12 ENCOUNTER — ANCILLARY PROCEDURE (OUTPATIENT)
Dept: CT IMAGING | Facility: CLINIC | Age: 27
End: 2022-10-12
Attending: PSYCHIATRY & NEUROLOGY
Payer: COMMERCIAL

## 2022-10-12 DIAGNOSIS — G40.109 LOCALIZATION-RELATED EPILEPSY (H): ICD-10-CM

## 2022-10-12 DIAGNOSIS — R51.9 NEW ONSET HEADACHE: ICD-10-CM

## 2022-10-12 LAB — SODIUM SERPL-SCNC: 141 MMOL/L (ref 133–144)

## 2022-10-12 PROCEDURE — 80177 DRUG SCRN QUAN LEVETIRACETAM: CPT

## 2022-10-12 PROCEDURE — 36415 COLL VENOUS BLD VENIPUNCTURE: CPT

## 2022-10-12 PROCEDURE — 84295 ASSAY OF SERUM SODIUM: CPT

## 2022-10-12 PROCEDURE — 70450 CT HEAD/BRAIN W/O DYE: CPT | Mod: GC | Performed by: RADIOLOGY

## 2022-10-12 PROCEDURE — 80183 DRUG SCRN QUANT OXCARBAZEPIN: CPT | Mod: 90

## 2022-10-12 PROCEDURE — 99000 SPECIMEN HANDLING OFFICE-LAB: CPT

## 2022-10-13 LAB — LEVETIRACETAM SERPL-MCNC: 30.6 ΜG/ML (ref 10–40)

## 2022-10-14 LAB — 10OH-CARBAZEPINE SERPL-MCNC: 13 UG/ML

## 2023-01-27 ENCOUNTER — VIRTUAL VISIT (OUTPATIENT)
Dept: NEUROLOGY | Facility: CLINIC | Age: 28
End: 2023-01-27
Payer: COMMERCIAL

## 2023-01-27 ENCOUNTER — TELEPHONE (OUTPATIENT)
Dept: NEUROSURGERY | Facility: CLINIC | Age: 28
End: 2023-01-27

## 2023-01-27 DIAGNOSIS — G40.109 FOCAL EPILEPSY (H): Primary | ICD-10-CM

## 2023-01-27 PROCEDURE — 99213 OFFICE O/P EST LOW 20 MIN: CPT | Mod: 95 | Performed by: PSYCHIATRY & NEUROLOGY

## 2023-01-27 NOTE — LETTER
"    2023         RE: Nancy Anderson  [address]  [place] [address]        Dear Colleague,    Thank you for referring your patient, Nancy Anderson, to the [facility] [place]. Please see a copy of my visit note below.    Nancy is a 27 year old who is being evaluated via a billable video visit.      How would you like to obtain your AVS? MyChart  If the video visit is dropped, the invitation should be resent by: Text to cell phone: [phone]  Will anyone else be joining your video visit? No    Video-Visit Details    Type of service:  Video Visit   Video Start Time: 8:41  Video End Time: 8:50    Originating Location (pt. Location): Home    Distant Location (provider location):  On-site  Platform used for Video Visit: [facility]      NEUROLOGY PROGRESS NOTE   [facility]    Patient:Nancy Anderson  : 1995  Age: 27 year old  Today's Virtual Visit: 2023    Epilepsy Data:  Copy forward: \"the patient's seizures started in  when she was 17 years old. She was traveling with her basketball team and she had a seizure. She had no warning with that. She had loss of consciousness and per description she had a generalized tonic-clonic seizure with foaming at mouth and tongue biting. She had 3 of those seizures in 1 day 2 hours apart. Mom witnessed the last one, in which she shook all over and tilted her head and her face turned purple. These happen rarely since she started on Keppra, and they only happen when she misses her medication. The last one was 2014. The patient describes she has no warning. Her mom has noted that she turns her head but does not remember which side. She stiffens up and shakes all over. She has frothing at the mouth, tongue biting and urinary incontinence. They usually last 1-2 minutes. Her eyes roll back. It takes about 15 minutes for her to come back to baseline, then she has a headache and is very sleepy.      She has another type " "of spell which started when she was 11 years old. She calls them dizzy spells. They have never been diagnosed as seizures. These never changed on Keppra. With these, she gets a feeling of disorientation and feeling dizzy. She says sometimes shegets spinning sensation and sometimes lightheadedness and then she loses consciousness. Mom says she will stare into space and says \"yup, yup\". She may blink. She may fumble with her hands. She also will turn her head but not sure which side and is unresponsive. She is amnestic about the event. These currently happen 5 times a month. It depends on her activity. The frequency varies. These started when she was playing sports, and they could have happened 3-4 times a week when she was very active.   Triggers for her seizures are lack of sleep, exercise. With many of her seizures, when the blood sugar was checked, it was in the 50s, so mom thinks that not eating well is a trigger for her seizures, but she does not think so.      RISK FACTORS FOR EPILEPSY: No gestational or  complications. She was born 2 weeks early and weighed 9 pounds 14 ounces. She stayed in the hospital for 2 days for jaundice. She denies meningitis, encephalitis or febrile seizures. No family history of epilepsy. When she was between age 9-12, she used to hit her head when she was getting angry, but she never lost consciousness, and there was not a significant head injury that they are aware of.\"          History of present illness:   Nancy is participating in this virtual visit for a follow-up on her epilepsy.  She was last seen 2022.  She did not have any seizures since last visit.  She is taking levetiracetam 1250 mg twice a day and oxcarbazepine 600 mg twice a day.  She denies dizziness, unsteadiness, double/blurred vision or other side effects.      The patient had a constant headache in the previous visit, which improved with Medrol dose pack.  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  Her headaches are in her forehead, throbbing, usually 6 and a scale of 1-10.  She denies nausea or photophobia.  Tylenol helps.  On average she takes Tylenol 3 times a week.     Latest Reference Range & Units 10/12/22 11:25   Keppra (Levetiracetam) Level 10.0 - 40.0  g/mL 30.6   Oxcarb or Eslicarb Metabolite (MHD) 3 - 35 ug/mL 13         Current Outpatient Medications   Medication Sig Dispense Refill     folic acid (FOLVITE) 1 MG tablet Take 1 tablet (1,000 mcg) by mouth daily 90 tablet 3     IBUPROFEN PO Take 800 mg by mouth every 6 hours as needed for moderate pain        levETIRAcetam (KEPPRA) 1000 MG tablet Take 1 tablet (1,000 mg) by mouth 2 times daily 180 tablet 3     levETIRAcetam (KEPPRA) 250 MG tablet Take 1 tablet (250 mg) by mouth 2 times daily 180 tablet 3     OXcarbazepine (TRILEPTAL) 600 MG tablet Take 1 tablet (600 mg) by mouth 2 times daily 180 tablet 3     LORazepam (LORAZEPAM INTENSOL) 2 MG/ML concentrated solution Administer 2 mg lorazepam between cheek and gum after a GTC seizure or 2 complex partial seizures in 24 hours. (Patient not taking: Reported on 1/27/2023) 30 mL 0     Exam:    There were no vitals taken for this visit.     Wt Readings from Last 5 Encounters:   04/08/19 85.8 kg (189 lb 3.2 oz)   02/05/18 85.2 kg (187 lb 12.8 oz)   03/17/16 81.1 kg (178 lb 14.4 oz)   08/20/15 87.5 kg (193 lb)   07/22/15 86.7 kg (191 lb 3.2 oz)     Alert, awake, NAD, no aphasia or dysarthria, EOMI, face is symmetric, moves upper extremities against gravity.    Assessment/Plan:   1. Focal epilepsy: The patient had no seizures since last visit.  Video EEG monitoring in July 2015 showed seizures out of left temporal region. 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  The patient is taking levetiracetam 1250 mg twice daily and oxcarbazepine 600 mg twice daily.  ASD levels are therapeutic.  She denies side  effects.     2. Headches:  Has occasional headaches in the middle of her shift, which improves on Tylenol.   I discussed the potential for getting a chronic daily headache with excessive use of over-the-counter pain medications.  She voiced understanding.     - Continue levetiracetam 1250 mg twice a day and oxcarbazepine 600 mg twice a day.      - Follow up in 6 months      As described above, I met with the patient for 8 minutes and during this time counseling was greater than 50% of the visit time.  I spent an additional 10 minutes on reviewing previous notes, labs and documentation.  This note was created in part by the use of Dragon voice recognition system. Inadvertent grammatical errors and typographical errors may still exist.  Cheryl Kumari MD        Again, thank you for allowing me to participate in the care of your patient.        Sincerely,        Cheryl Kumari MD

## 2023-01-27 NOTE — PROGRESS NOTES
"Nancy is a 27 year old who is being evaluated via a billable video visit.      How would you like to obtain your AVS? MyChart  If the video visit is dropped, the invitation should be resent by: Text to cell phone: [phone]  Will anyone else be joining your video visit? No    Video-Visit Details    Type of service:  Video Visit   Video Start Time: 8:41  Video End Time: 8:50    Originating Location (pt. Location): Home    Distant Location (provider location):  On-site  Platform used for Video Visit: [facility]      NEUROLOGY PROGRESS NOTE   [facility]    Patient:Nancy Anderson  : 1995  Age: 27 year old  Today's Virtual Visit: 2023    Epilepsy Data:  Copy forward: \"the patient's seizures started in  when she was 17 years old. She was traveling with her basketball team and she had a seizure. She had no warning with that. She had loss of consciousness and per description she had a generalized tonic-clonic seizure with foaming at mouth and tongue biting. She had 3 of those seizures in 1 day 2 hours apart. Mom witnessed the last one, in which she shook all over and tilted her head and her face turned purple. These happen rarely since she started on Keppra, and they only happen when she misses her medication. The last one was 2014. The patient describes she has no warning. Her mom has noted that she turns her head but does not remember which side. She stiffens up and shakes all over. She has frothing at the mouth, tongue biting and urinary incontinence. They usually last 1-2 minutes. Her eyes roll back. It takes about 15 minutes for her to come back to baseline, then she has a headache and is very sleepy.      She has another type of spell which started when she was 11 years old. She calls them dizzy spells. They have never been diagnosed as seizures. These never changed on Keppra. With these, she gets a feeling of disorientation and feeling dizzy. She says sometimes shegets spinning sensation " "and sometimes lightheadedness and then she loses consciousness. Mom says she will stare into space and says \"yup, yup\". She may blink. She may fumble with her hands. She also will turn her head but not sure which side and is unresponsive. She is amnestic about the event. These currently happen 5 times a month. It depends on her activity. The frequency varies. These started when she was playing sports, and they could have happened 3-4 times a week when she was very active.   Triggers for her seizures are lack of sleep, exercise. With many of her seizures, when the blood sugar was checked, it was in the 50s, so mom thinks that not eating well is a trigger for her seizures, but she does not think so.      RISK FACTORS FOR EPILEPSY: No gestational or  complications. She was born 2 weeks early and weighed 9 pounds 14 ounces. She stayed in the hospital for 2 days for jaundice. She denies meningitis, encephalitis or febrile seizures. No family history of epilepsy. When she was between age 9-12, she used to hit her head when she was getting angry, but she never lost consciousness, and there was not a significant head injury that they are aware of.\"          History of present illness:   Nancy is participating in this virtual visit for a follow-up on her epilepsy.  She was last seen 2022.  She did not have any seizures since last visit.  She is taking levetiracetam 1250 mg twice a day and oxcarbazepine 600 mg twice a day.  She denies dizziness, unsteadiness, double/blurred vision or other side effects.      The patient had a constant headache in the previous visit, which improved with Medrol dose pack.  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  Her headaches are in her forehead, throbbing, usually 6 and a scale of 1-10.  She denies nausea or photophobia.  Tylenol helps.  On average she takes Tylenol 3 times a week.     Latest " Reference Range & Units 10/12/22 11:25   Keppra (Levetiracetam) Level 10.0 - 40.0  g/mL 30.6   Oxcarb or Eslicarb Metabolite (MHD) 3 - 35 ug/mL 13         Current Outpatient Medications   Medication Sig Dispense Refill     folic acid (FOLVITE) 1 MG tablet Take 1 tablet (1,000 mcg) by mouth daily 90 tablet 3     IBUPROFEN PO Take 800 mg by mouth every 6 hours as needed for moderate pain        levETIRAcetam (KEPPRA) 1000 MG tablet Take 1 tablet (1,000 mg) by mouth 2 times daily 180 tablet 3     levETIRAcetam (KEPPRA) 250 MG tablet Take 1 tablet (250 mg) by mouth 2 times daily 180 tablet 3     OXcarbazepine (TRILEPTAL) 600 MG tablet Take 1 tablet (600 mg) by mouth 2 times daily 180 tablet 3     LORazepam (LORAZEPAM INTENSOL) 2 MG/ML concentrated solution Administer 2 mg lorazepam between cheek and gum after a GTC seizure or 2 complex partial seizures in 24 hours. (Patient not taking: Reported on 1/27/2023) 30 mL 0     Exam:    There were no vitals taken for this visit.     Wt Readings from Last 5 Encounters:   04/08/19 85.8 kg (189 lb 3.2 oz)   02/05/18 85.2 kg (187 lb 12.8 oz)   03/17/16 81.1 kg (178 lb 14.4 oz)   08/20/15 87.5 kg (193 lb)   07/22/15 86.7 kg (191 lb 3.2 oz)     Alert, awake, NAD, no aphasia or dysarthria, EOMI, face is symmetric, moves upper extremities against gravity.    Assessment/Plan:   1. Focal epilepsy: The patient had no seizures since last visit.  Video EEG monitoring in July 2015 showed seizures out of left temporal region. 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  The patient is taking levetiracetam 1250 mg twice daily and oxcarbazepine 600 mg twice daily.  ASD levels are therapeutic.  She denies side effects.     2. Headches:  Has occasional headaches in the middle of her shift, which improves on Tylenol.   I discussed the potential for getting a chronic daily headache with excessive use of over-the-counter pain medications.  She voiced understanding.     -  Continue levetiracetam 1250 mg twice a day and oxcarbazepine 600 mg twice a day.      - Follow up in 6 months      As described above, I met with the patient for 8 minutes and during this time counseling was greater than 50% of the visit time.  I spent an additional 10 minutes on reviewing previous notes, labs and documentation.  This note was created in part by the use of Dragon voice recognition system. Inadvertent grammatical errors and typographical errors may still exist.  Cheryl Kumari MD     What Type Of Note Output Would You Prefer (Optional)?: Standard Output Is The Patient Presenting As Previously Scheduled?: Yes Is This A New Presentation, Or A Follow-Up?: Rash Additional History: Tried clobetasol but it had little effect.

## 2023-01-27 NOTE — LETTER
Date:January 30, 2023      Patient was self referred, no letter generated. Do not send.        [facility] Health Information

## 2023-01-27 NOTE — TELEPHONE ENCOUNTER
I attempted to call patient to schedule Return in about 6 months (around 7/27/2023). I was unable reach pt or LM. I will attempt to call pt later.    Deirdre Darden LPN

## 2023-01-30 ENCOUNTER — TELEPHONE (OUTPATIENT)
Dept: NEUROLOGY | Facility: CLINIC | Age: 28
End: 2023-01-30
Payer: COMMERCIAL

## 2023-01-30 NOTE — TELEPHONE ENCOUNTER
Left Voicemail (1st Attempt) for the patient to call back and schedule the following:    Appointment type: Return  Provider: Dr. Reynaga  Return date: 7/23/2023  Specialty phone number: [phone]  Additional appointment(s) needed:   Additonal Notes:       Return in about 6 months (around 7/27/2023).      Also sent a [employer] message.    Mariel R/Procedure    [facility]   Neurology, NeuroSurgery, NeuroPsychology and Pain Management Specialties  Medical/Surgical Adult Specialties

## 2023-02-28 DIAGNOSIS — G40.109 LOCALIZATION-RELATED EPILEPSY (H): ICD-10-CM

## 2023-03-01 NOTE — CONFIDENTIAL NOTE
Please review and sign pending prescriptions.    Yuli Russell, RN Care Coordinator   Neurology/Neurosurgery/PM&R/ Pain Management

## 2023-03-01 NOTE — TELEPHONE ENCOUNTER
Medication: levETIRAcetam (KEPPRA) 1000 MG tablet  Sig: Take 1 tablet (1,000 mg) by mouth 2 times daily  Date last written: 2/25/22  Dispensed amount: 180  Refills: 3    Medication: levETIRAcetam (KEPPRA) 250 MG tablet  Sig: Take 1 tablet (250 mg) by mouth 2 times daily   Date last written: 2/25/22  Dispensed amount: 180  Refills: 3    Medication: OXcarbazepine (TRILEPTAL) 600 MG tablet  Sig: Take 1 tablet (600 mg) by mouth 2 times daily  Date last written: 2/25/22  Dispensed amount: 180  Refills: 3        Requested Pharmacy: [facility]     Pt's last office visit: 1/27/23  Next scheduled office visit: 7/21/23      Per the RN/LPN medication refill protocol, writer is unable to refill this request.     Yuli Russell RN Care Coordinator   Neurology/Neurosurgery/PM&R/ Pain Management

## 2023-03-02 RX ORDER — LEVETIRACETAM 250 MG/1
TABLET ORAL
Qty: 180 TABLET | Refills: 3 | Status: SHIPPED | OUTPATIENT
Start: 2023-03-02 | End: 2024-01-19

## 2023-03-02 RX ORDER — LEVETIRACETAM 1000 MG/1
TABLET ORAL
Qty: 180 TABLET | Refills: 3 | Status: SHIPPED | OUTPATIENT
Start: 2023-03-02 | End: 2024-01-19

## 2023-03-02 RX ORDER — OXCARBAZEPINE 600 MG/1
TABLET, FILM COATED ORAL
Qty: 180 TABLET | Refills: 3 | Status: SHIPPED | OUTPATIENT
Start: 2023-03-02 | End: 2024-01-19

## 2023-03-02 NOTE — CONFIDENTIAL NOTE
The prescriptions have been sent to the pharmacy and the pt was notified.    Yuli Russell RN Care Coordinator   Neurology/Neurosurgery/PM&R/ Pain Management

## 2023-03-02 NOTE — CONFIDENTIAL NOTE
Text message sent to the provider asking her to check her Epic inbasket.     Yuli Russell, RN Care Coordinator   Neurology/Neurosurgery/PM&R/ Pain Management

## 2023-03-02 NOTE — TELEPHONE ENCOUNTER
[facility] Call Center    Phone Message    May a detailed message be left on voicemail: yes     Reason for Call: Other: Pt called requesting a status check on when she can be receiving medication refill.  Writer inform Pt that its with the provider to sign, however Pt is concerned that she wont have her medication in time and would like a nurse to reach out.    Please call Pt back at [phone] to discuss further.    Action Taken: Message routed to:  Other: [facility]    Travel Screening: Not Applicable

## 2023-05-16 NOTE — PROGRESS NOTES
Called patient regarding change in appointment time for today. Left voicemail for patient to call to verify he is able to come at 1300 instead of 1400.   The patient has transportation issues, so she wanted to do a phone call F/U instead of coming to the clinic.  In February 2018, we increased her OXC to 900 mg bid, since upon starting OXC her CPSs decreased to once a month from 3-4 a month.  However, she states with increasing OXC her seizures increased, so she went back to 600-600 shortly after the increment and her seizures have been controlled. She says she did not have any seizures in the past few months.  She is also on Keppra.  She denies side effects.      - Continue Keppra 1250 mg bid and  mg bid  - Call with seizures  - RTC in 1 year      Cheryl Kumari MD

## 2023-06-02 ENCOUNTER — HEALTH MAINTENANCE LETTER (OUTPATIENT)
Age: 28
End: 2023-06-02

## 2023-07-21 ENCOUNTER — TELEPHONE (OUTPATIENT)
Dept: NEUROSURGERY | Facility: CLINIC | Age: 28
End: 2023-07-21

## 2023-07-21 ENCOUNTER — VIRTUAL VISIT (OUTPATIENT)
Dept: NEUROLOGY | Facility: CLINIC | Age: 28
End: 2023-07-21
Attending: PSYCHIATRY & NEUROLOGY
Payer: COMMERCIAL

## 2023-07-21 DIAGNOSIS — G40.109 FOCAL EPILEPSY (H): Primary | ICD-10-CM

## 2023-07-21 PROCEDURE — 99213 OFFICE O/P EST LOW 20 MIN: CPT | Mod: 95 | Performed by: PSYCHIATRY & NEUROLOGY

## 2023-07-21 NOTE — PROGRESS NOTES
"Nancy is a 28 year old who is being evaluated via a billable video visit.      How would you like to obtain your AVS? MyChart  If the video visit is dropped, the invitation should be resent by: Send to e-mail at: [email or web address]  Will anyone else be joining your video visit? No        Video-Visit Details    Type of service:  Video Visit   Video Start Time: 9:00  Video End Time: 9:06    Originating Location (pt. Location): Home    Distant Location (provider location):  On-site  Platform used for Video Visit: [facility]      NEUROLOGY PROGRESS NOTE   [facility]    Patient:Nancy Anderson  : 1995  Age: 28 year old  Today's Virtual Visit: 2023    Epilepsy Data:  Copy forward: \"the patient's seizures started in  when she was 17 years old. She was traveling with her basketball team and she had a seizure. She had no warning with that. She had loss of consciousness and per description she had a generalized tonic-clonic seizure with foaming at mouth and tongue biting. She had 3 of those seizures in 1 day 2 hours apart. Mom witnessed the last one, in which she shook all over and tilted her head and her face turned purple. These happen rarely since she started on Keppra, and they only happen when she misses her medication. The last one was 2014. The patient describes she has no warning. Her mom has noted that she turns her head but does not remember which side. She stiffens up and shakes all over. She has frothing at the mouth, tongue biting and urinary incontinence. They usually last 1-2 minutes. Her eyes roll back. It takes about 15 minutes for her to come back to baseline, then she has a headache and is very sleepy.      She has another type of spell which started when she was 11 years old. She calls them dizzy spells. They have never been diagnosed as seizures. These never changed on Keppra. With these, she gets a feeling of disorientation and feeling dizzy. She says sometimes shegets spinning " "sensation and sometimes lightheadedness and then she loses consciousness. Mom says she will stare into space and says \"yup, yup\". She may blink. She may fumble with her hands. She also will turn her head but not sure which side and is unresponsive. She is amnestic about the event. These currently happen 5 times a month. It depends on her activity. The frequency varies. These started when she was playing sports, and they could have happened 3-4 times a week when she was very active.   Triggers for her seizures are lack of sleep, exercise. With many of her seizures, when the blood sugar was checked, it was in the 50s, so mom thinks that not eating well is a trigger for her seizures, but she does not think so.      RISK FACTORS FOR EPILEPSY: No gestational or  complications. She was born 2 weeks early and weighed 9 pounds 14 ounces. She stayed in the hospital for 2 days for jaundice. She denies meningitis, encephalitis or febrile seizures. No family history of epilepsy. When she was between age 10-12, she used to hit her head when she was getting angry, but she never lost consciousness, and there was not a significant head injury that they are aware of.\"       History of present illness:     Nancy is participating in this virtual visit for a follow up on her epilepsy. She did not have any seizures since last visit 2023.  She is taking levetiracetam 1000 mg bid and oxcarbazepine 600 mg bid. She denies side effects.    Headaches have been pretty much controlled.      ASD levels 10/12/22  LVT level 30.6  OXC level 13  Na 141    Current Outpatient Medications   Medication Sig Dispense Refill     folic acid (FOLVITE) 1 MG tablet Take 1 tablet (1,000 mcg) by mouth daily 90 tablet 3     IBUPROFEN PO Take 800 mg by mouth every 6 hours as needed for moderate pain        levETIRAcetam (KEPPRA) 1000 MG tablet Take 1 tablet (1,000 mg) by mouth twice a day. 180 tablet 3     levETIRAcetam (KEPPRA) 250 MG tablet Take 1 " tablet (250 mg) by mouth twice a day. 180 tablet 3     LORazepam (LORAZEPAM INTENSOL) 2 MG/ML concentrated solution Administer 2 mg lorazepam between cheek and gum after a GTC seizure or 2 complex partial seizures in 24 hours. 30 mL 0     OXcarbazepine (TRILEPTAL) 600 MG tablet Take 1 tablet (600 mg) by mouth twice a day. 180 tablet 3     Exam:    There were no vitals taken for this visit.     Wt Readings from Last 5 Encounters:   04/08/19 85.8 kg (189 lb 3.2 oz)   02/05/18 85.2 kg (187 lb 12.8 oz)   03/17/16 81.1 kg (178 lb 14.4 oz)   08/20/15 87.5 kg (193 lb)   07/22/15 86.7 kg (191 lb 3.2 oz)     Alert, oriented, NAD, no aphasia or dysarthria, EOMI, face symmetric, moves upper extremities against gravity.     Assessment/Plan:  1. Focal epilepsy: The patient had no seizures since last visit.  Video EEG monitoring in July 2015 showed seizures out of left temporal region. 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  The patient is taking levetiracetam 1250 mg twice daily and oxcarbazepine 600 mg twice daily.  ASD levels are therapeutic.  She denies side effects.       - Continue levetiracetam 1250 mg twice a day and oxcarbazepine 600 mg twice a day.      - Follow up in 6 months      As described above, I met with the patient for 5 minutes and during this time counseling was greater than 50% of the visit time.  Cheryl Kumari MD

## 2023-07-21 NOTE — LETTER
"    2023         RE: Nancy Anderson  [address]  [place] [address]        Dear Colleague,    Thank you for referring your patient, Nancy Anderson, to the [facility] [place]. Please see a copy of my visit note below.    Nancy is a 28 year old who is being evaluated via a billable video visit.      How would you like to obtain your AVS? MyChart  If the video visit is dropped, the invitation should be resent by: Send to e-mail at: [email or web address]  Will anyone else be joining your video visit? No        Video-Visit Details    Type of service:  Video Visit   Video Start Time: 9:00  Video End Time: 9:06    Originating Location (pt. Location): Home    Distant Location (provider location):  On-site  Platform used for Video Visit: [facility]      NEUROLOGY PROGRESS NOTE   [facility]    Patient:Nancy Anderson  : 1995  Age: 28 year old  Today's Virtual Visit: 2023    Epilepsy Data:  Copy forward: \"the patient's seizures started in  when she was 17 years old. She was traveling with her basketball team and she had a seizure. She had no warning with that. She had loss of consciousness and per description she had a generalized tonic-clonic seizure with foaming at mouth and tongue biting. She had 3 of those seizures in 1 day 2 hours apart. Mom witnessed the last one, in which she shook all over and tilted her head and her face turned purple. These happen rarely since she started on Keppra, and they only happen when she misses her medication. The last one was 2014. The patient describes she has no warning. Her mom has noted that she turns her head but does not remember which side. She stiffens up and shakes all over. She has frothing at the mouth, tongue biting and urinary incontinence. They usually last 1-2 minutes. Her eyes roll back. It takes about 15 minutes for her to come back to baseline, then she has a headache and is very sleepy.      She has " "another type of spell which started when she was 11 years old. She calls them dizzy spells. They have never been diagnosed as seizures. These never changed on Keppra. With these, she gets a feeling of disorientation and feeling dizzy. She says sometimes shegets spinning sensation and sometimes lightheadedness and then she loses consciousness. Mom says she will stare into space and says \"yup, yup\". She may blink. She may fumble with her hands. She also will turn her head but not sure which side and is unresponsive. She is amnestic about the event. These currently happen 5 times a month. It depends on her activity. The frequency varies. These started when she was playing sports, and they could have happened 3-4 times a week when she was very active.   Triggers for her seizures are lack of sleep, exercise. With many of her seizures, when the blood sugar was checked, it was in the 50s, so mom thinks that not eating well is a trigger for her seizures, but she does not think so.      RISK FACTORS FOR EPILEPSY: No gestational or  complications. She was born 2 weeks early and weighed 9 pounds 14 ounces. She stayed in the hospital for 2 days for jaundice. She denies meningitis, encephalitis or febrile seizures. No family history of epilepsy. When she was between age 9-12, she used to hit her head when she was getting angry, but she never lost consciousness, and there was not a significant head injury that they are aware of.\"       History of present illness:     Nancy is participating in this virtual visit for a follow up on her epilepsy. She did not have any seizures since last visit 2023.  She is taking levetiracetam 1000 mg bid and oxcarbazepine 600 mg bid. She denies side effects.    Headaches have been pretty much controlled.      ASD levels 10/12/22  LVT level 30.6  OXC level 13  Na 141    Current Outpatient Medications   Medication Sig Dispense Refill     folic acid (FOLVITE) 1 MG tablet Take 1 tablet " (1,000 mcg) by mouth daily 90 tablet 3     IBUPROFEN PO Take 800 mg by mouth every 6 hours as needed for moderate pain        levETIRAcetam (KEPPRA) 1000 MG tablet Take 1 tablet (1,000 mg) by mouth twice a day. 180 tablet 3     levETIRAcetam (KEPPRA) 250 MG tablet Take 1 tablet (250 mg) by mouth twice a day. 180 tablet 3     LORazepam (LORAZEPAM INTENSOL) 2 MG/ML concentrated solution Administer 2 mg lorazepam between cheek and gum after a GTC seizure or 2 complex partial seizures in 24 hours. 30 mL 0     OXcarbazepine (TRILEPTAL) 600 MG tablet Take 1 tablet (600 mg) by mouth twice a day. 180 tablet 3     Exam:    There were no vitals taken for this visit.     Wt Readings from Last 5 Encounters:   04/08/19 85.8 kg (189 lb 3.2 oz)   02/05/18 85.2 kg (187 lb 12.8 oz)   03/17/16 81.1 kg (178 lb 14.4 oz)   08/20/15 87.5 kg (193 lb)   07/22/15 86.7 kg (191 lb 3.2 oz)     Alert, oriented, NAD, no aphasia or dysarthria, EOMI, face symmetric, moves upper extremities against gravity.     Assessment/Plan:  1. Focal epilepsy: The patient had no seizures since last visit.  Video EEG monitoring in July 2015 showed seizures out of left temporal region. 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  The patient is taking levetiracetam 1250 mg twice daily and oxcarbazepine 600 mg twice daily.  ASD levels are therapeutic.  She denies side effects.       - Continue levetiracetam 1250 mg twice a day and oxcarbazepine 600 mg twice a day.      - Follow up in 6 months      As described above, I met with the patient for 5 minutes and during this time counseling was greater than 50% of the visit time.  Cheryl Kumari MD        Again, thank you for allowing me to participate in the care of your patient.        Sincerely,        Cheryl Kumari MD

## 2024-01-19 ENCOUNTER — VIRTUAL VISIT (OUTPATIENT)
Dept: NEUROLOGY | Facility: CLINIC | Age: 29
End: 2024-01-19
Payer: COMMERCIAL

## 2024-01-19 DIAGNOSIS — G40.109 LOCALIZATION-RELATED EPILEPSY (H): ICD-10-CM

## 2024-01-19 PROCEDURE — 99213 OFFICE O/P EST LOW 20 MIN: CPT | Mod: 95 | Performed by: PSYCHIATRY & NEUROLOGY

## 2024-01-19 RX ORDER — LEVETIRACETAM 250 MG/1
TABLET ORAL
Qty: 180 TABLET | Refills: 3 | Status: SHIPPED | OUTPATIENT
Start: 2024-01-19

## 2024-01-19 RX ORDER — OXCARBAZEPINE 600 MG/1
600 TABLET, FILM COATED ORAL 2 TIMES DAILY
Qty: 180 TABLET | Refills: 3 | Status: SHIPPED | OUTPATIENT
Start: 2024-01-19 | End: 2024-08-16

## 2024-01-19 RX ORDER — LEVETIRACETAM 1000 MG/1
TABLET ORAL
Qty: 180 TABLET | Refills: 3 | Status: SHIPPED | OUTPATIENT
Start: 2024-01-19

## 2024-01-19 NOTE — NURSING NOTE
Is the patient currently in the state of [place]? YES    Visit mode:VIDEO    If the visit is dropped, the patient can be reconnected by: TELEPHONE VISIT: Phone number:   Telephone Information:   Mobile [phone]       Will anyone else be joining the visit? NO  (If patient encounters technical issues they should call [phone]    How would you like to obtain your AVS? MyChart    Are changes needed to the allergy or medication list? Pt stated no med changes    Reason for visit: Video Visit (6 month follow-up )    Soraya CORDOBA

## 2024-01-19 NOTE — LETTER
"    2024         RE: Nancy Anderson  [address]  [place] [address]        Dear Colleague,    Thank you for referring your patient, Nancy Anderson, to the [facility]UROLOG[facility] [place]. Please see a copy of my visit note below.    Virtual Visit Details    Type of service:  Video Visit   Video Start Time: 9:34  Video End Time: 9:43    Originating Location (pt. Location): Home    Distant Location (provider location):  Off-site  Platform used for Video Visit: [facility]      NEUROLOGY PROGRESS NOTE   [facility]    Patient:Nancy Anderson  : 1995  Age: 28 year old  Today's Virtual Visit: 2024    Epilepsy Data:  Copy forward: \"the patient's seizures started in  when she was 17 years old. She was traveling with her basketball team and she had a seizure. She had no warning with that. She had loss of consciousness and per description she had a generalized tonic-clonic seizure with foaming at mouth and tongue biting. She had 3 of those seizures in 1 day 2 hours apart. Mom witnessed the last one, in which she shook all over and tilted her head and her face turned purple. These happen rarely since she started on Keppra, and they only happen when she misses her medication. The last one was 2014. The patient describes she has no warning. Her mom has noted that she turns her head but does not remember which side. She stiffens up and shakes all over. She has frothing at the mouth, tongue biting and urinary incontinence. They usually last 1-2 minutes. Her eyes roll back. It takes about 15 minutes for her to come back to baseline, then she has a headache and is very sleepy.      She has another type of spell which started when she was 11 years old. She calls them dizzy spells. They have never been diagnosed as seizures. These never changed on Keppra. With these, she gets a feeling of disorientation and feeling dizzy. She says sometimes shegets spinning sensation and " "sometimes lightheadedness and then she loses consciousness. Mom says she will stare into space and says \"yup, yup\". She may blink. She may fumble with her hands. She also will turn her head but not sure which side and is unresponsive. She is amnestic about the event. These currently happen 5 times a month. It depends on her activity. The frequency varies. These started when she was playing sports, and they could have happened 3-4 times a week when she was very active.   Triggers for her seizures are lack of sleep, exercise. With many of her seizures, when the blood sugar was checked, it was in the 50s, so mom thinks that not eating well is a trigger for her seizures, but she does not think so.      RISK FACTORS FOR EPILEPSY: No gestational or  complications. She was born 2 weeks early and weighed 9 pounds 14 ounces. She stayed in the hospital for 2 days for jaundice. She denies meningitis, encephalitis or febrile seizures. No family history of epilepsy. When she was between age 9-12, she used to hit her head when she was getting angry, but she never lost consciousness, and there was not a significant head injury that they are aware of.\"       History of present illness:     Ms. Nancy Anderson is a 28-year-old woman with epilepsy and headaches.  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  She has not had any seizures since last seen.  She taking levetiracetam 1250 mg twice a day and oxcarbazepine 600 mg twice a day.  She denies tiredness, sleepiness, dizziness or unsteadiness or other side effects.      Her headaches have improved.    Denies recent illnesses, ER visit hospitalization.    Current Outpatient Medications   Medication Sig Dispense Refill     folic acid (FOLVITE) 1 MG tablet Take 1 tablet (1,000 mcg) by mouth daily 90 tablet 3     IBUPROFEN PO Take 800 mg by mouth every 6 hours as needed for moderate pain        levETIRAcetam (KEPPRA) 1000 MG tablet Take 1 tablet (1,000 mg) by mouth " twice a day. 180 tablet 3     levETIRAcetam (KEPPRA) 250 MG tablet Take 1 tablet (250 mg) by mouth twice a day. 180 tablet 3     LORazepam (LORAZEPAM INTENSOL) 2 MG/ML concentrated solution Administer 2 mg lorazepam between cheek and gum after a GTC seizure or 2 complex partial seizures in 24 hours. 30 mL 0     OXcarbazepine (TRILEPTAL) 600 MG tablet Take 1 tablet (600 mg) by mouth twice a day. 180 tablet 3     Exam:    There were no vitals taken for this visit.     Wt Readings from Last 5 Encounters:   04/08/19 85.8 kg (189 lb 3.2 oz)   02/05/18 85.2 kg (187 lb 12.8 oz)   03/17/16 81.1 kg (178 lb 14.4 oz)   08/20/15 87.5 kg (193 lb)   07/22/15 86.7 kg (191 lb 3.2 oz)     Alert, awake, oriented, no aphasia or dysarthria, EOMI, face symmetric, moves upper extremities equally against gravity, normal finger-to-nose, no dysmetria or tremors.    Assessment/Plan:     Focal epilepsy: Patient's seizures are controlled on dual therapy with levetiracetam and oxcarbazepine.  Video EEG monitoring in July 2015 showed seizures out of left temporal region. 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  The patient is taking levetiracetam 1250 mg twice daily and oxcarbazepine 600 mg twice daily.  She denies side effects.        - Continue levetiracetam 1250 mg twice a day and oxcarbazepine 600 mg twice a day.     - Obtain ASD levels and sodium     - Follow up in 6 months      As described above, I met with the patient for 8 minutes and during this time counseling was greater than 50% of the visit time.  Cheryl Kumari MD        Again, thank you for allowing me to participate in the care of your patient.        Sincerely,        Cheryl Kumari MD

## 2024-01-19 NOTE — PROGRESS NOTES
"Virtual Visit Details    Type of service:  Video Visit   Video Start Time: 9:34  Video End Time: 9:43    Originating Location (pt. Location): Home    Distant Location (provider location):  Off-site  Platform used for Video Visit: [facility]      NEUROLOGY PROGRESS NOTE   [facility]    Patient:Nancy Anderson  : 1995  Age: 28 year old  Today's Virtual Visit: 2024    Epilepsy Data:  Copy forward: \"the patient's seizures started in  when she was 17 years old. She was traveling with her basketball team and she had a seizure. She had no warning with that. She had loss of consciousness and per description she had a generalized tonic-clonic seizure with foaming at mouth and tongue biting. She had 3 of those seizures in 1 day 2 hours apart. Mom witnessed the last one, in which she shook all over and tilted her head and her face turned purple. These happen rarely since she started on Keppra, and they only happen when she misses her medication. The last one was 2014. The patient describes she has no warning. Her mom has noted that she turns her head but does not remember which side. She stiffens up and shakes all over. She has frothing at the mouth, tongue biting and urinary incontinence. They usually last 1-2 minutes. Her eyes roll back. It takes about 15 minutes for her to come back to baseline, then she has a headache and is very sleepy.      She has another type of spell which started when she was 11 years old. She calls them dizzy spells. They have never been diagnosed as seizures. These never changed on Keppra. With these, she gets a feeling of disorientation and feeling dizzy. She says sometimes shegets spinning sensation and sometimes lightheadedness and then she loses consciousness. Mom says she will stare into space and says \"yup, yup\". She may blink. She may fumble with her hands. She also will turn her head but not sure which side and is unresponsive. She is amnestic about the event. These " "currently happen 5 times a month. It depends on her activity. The frequency varies. These started when she was playing sports, and they could have happened 3-4 times a week when she was very active.   Triggers for her seizures are lack of sleep, exercise. With many of her seizures, when the blood sugar was checked, it was in the 50s, so mom thinks that not eating well is a trigger for her seizures, but she does not think so.      RISK FACTORS FOR EPILEPSY: No gestational or  complications. She was born 2 weeks early and weighed 9 pounds 14 ounces. She stayed in the hospital for 2 days for jaundice. She denies meningitis, encephalitis or febrile seizures. No family history of epilepsy. When she was between age 9-12, she used to hit her head when she was getting angry, but she never lost consciousness, and there was not a significant head injury that they are aware of.\"       History of present illness:     Ms. Nancy Anderson is a 28-year-old woman with epilepsy and headaches.  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  She has not had any seizures since last seen.  She taking levetiracetam 1250 mg twice a day and oxcarbazepine 600 mg twice a day.  She denies tiredness, sleepiness, dizziness or unsteadiness or other side effects.      Her headaches have improved.    Denies recent illnesses, ER visit hospitalization.    Current Outpatient Medications   Medication Sig Dispense Refill     folic acid (FOLVITE) 1 MG tablet Take 1 tablet (1,000 mcg) by mouth daily 90 tablet 3     IBUPROFEN PO Take 800 mg by mouth every 6 hours as needed for moderate pain        levETIRAcetam (KEPPRA) 1000 MG tablet Take 1 tablet (1,000 mg) by mouth twice a day. 180 tablet 3     levETIRAcetam (KEPPRA) 250 MG tablet Take 1 tablet (250 mg) by mouth twice a day. 180 tablet 3     LORazepam (LORAZEPAM INTENSOL) 2 MG/ML concentrated solution Administer 2 mg lorazepam between cheek and gum after a GTC seizure or 2 complex " partial seizures in 24 hours. 30 mL 0     OXcarbazepine (TRILEPTAL) 600 MG tablet Take 1 tablet (600 mg) by mouth twice a day. 180 tablet 3     Exam:    There were no vitals taken for this visit.     Wt Readings from Last 5 Encounters:   04/08/19 85.8 kg (189 lb 3.2 oz)   02/05/18 85.2 kg (187 lb 12.8 oz)   03/17/16 81.1 kg (178 lb 14.4 oz)   08/20/15 87.5 kg (193 lb)   07/22/15 86.7 kg (191 lb 3.2 oz)     Alert, awake, oriented, no aphasia or dysarthria, EOMI, face symmetric, moves upper extremities equally against gravity, normal finger-to-nose, no dysmetria or tremors.    Assessment/Plan:     Focal epilepsy: Patient's seizures are controlled on dual therapy with levetiracetam and oxcarbazepine.  Video EEG monitoring in July 2015 showed seizures out of left temporal region. 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  The patient is taking levetiracetam 1250 mg twice daily and oxcarbazepine 600 mg twice daily.  She denies side effects.        - Continue levetiracetam 1250 mg twice a day and oxcarbazepine 600 mg twice a day.     - Obtain ASD levels and sodium     - Follow up in 6 months      As described above, I met with the patient for 8 minutes and during this time counseling was greater than 50% of the visit time.  Cheryl Kumari MD

## 2024-01-22 ENCOUNTER — TELEPHONE (OUTPATIENT)
Dept: NEUROLOGY | Facility: CLINIC | Age: 29
End: 2024-01-22
Payer: COMMERCIAL

## 2024-01-22 NOTE — TELEPHONE ENCOUNTER
Left Voicemail (1st Attempt) for the patient to call back and schedule the following:    Appointment type: return  Provider: dr. kothari  Return date: 7/19/2024  Specialty phone number: [phone]   Additonal Notes:  Return in about 6 months (around 7/19/2024).     [facility]   Orthopedics, Podiatry, Sports Medicine, Ent ,Eye , Audiology, Adult Endocrine & Diabetes, Nutrition & Medication Therapy Management Specialties   [facility]

## 2024-01-26 NOTE — TELEPHONE ENCOUNTER
Left Voicemail (2nd Attempt) for the patient to call back and schedule the following:    Appointment type: return  Provider: dr. kothari  Return date: 7/19/2024  Specialty phone number: [phone]   Additonal Notes:  Return in about 6 months (around 7/19/2024).     [facility]   Orthopedics, Podiatry, Sports Medicine, Ent ,Eye , Audiology, Adult Endocrine & Diabetes, Nutrition & Medication Therapy Management Specialties   [facility]

## 2024-06-17 ENCOUNTER — TELEPHONE (OUTPATIENT)
Dept: NEUROLOGY | Facility: CLINIC | Age: 29
End: 2024-06-17
Payer: COMMERCIAL

## 2024-06-17 DIAGNOSIS — R56.9 SEIZURES (H): Primary | ICD-10-CM

## 2024-06-17 NOTE — TELEPHONE ENCOUNTER
Called and spoke with patient. Patient wanted to schedule in July, but writer was unable to find any openings, so scheduled follow up for next available on August 9th, and placed her on the waitlist. Patient wanted to update Dr. Reynaga that she is pregnant. She says she was told not to change her medications other than to increase her folic acid. Writer stated understanding and let patient know that writer would send an update to Dr. Reynaga. No further concerns at this time.

## 2024-06-17 NOTE — TELEPHONE ENCOUNTER
[facility]    Phone Message    May a detailed message be left on voicemail: yes     Reason for Call: Pt is trying to schedule a in person follow up with provider at the [place] location, writer unable to schedule as providers template is not available.    Please contact Pt to schedule at [phone]    Action Taken: Message routed to:  Other: [facility]     Travel Screening: Not Applicable

## 2024-08-09 ENCOUNTER — OFFICE VISIT (OUTPATIENT)
Dept: NEUROLOGY | Facility: CLINIC | Age: 29
End: 2024-08-09
Payer: COMMERCIAL

## 2024-08-09 VITALS
HEART RATE: 73 BPM | WEIGHT: 215 LBS | DIASTOLIC BLOOD PRESSURE: 75 MMHG | BODY MASS INDEX: 29.12 KG/M2 | HEIGHT: 72 IN | SYSTOLIC BLOOD PRESSURE: 115 MMHG

## 2024-08-09 DIAGNOSIS — G40.109 FOCAL EPILEPSY (H): Primary | ICD-10-CM

## 2024-08-09 DIAGNOSIS — G40.109 LOCALIZATION-RELATED EPILEPSY (H): ICD-10-CM

## 2024-08-09 LAB
LEVETIRACETAM SERPL-MCNC: 25.9 ΜG/ML (ref 10–40)
SODIUM SERPL-SCNC: 137 MMOL/L (ref 135–145)

## 2024-08-09 PROCEDURE — 84295 ASSAY OF SERUM SODIUM: CPT | Performed by: PSYCHIATRY & NEUROLOGY

## 2024-08-09 PROCEDURE — 36415 COLL VENOUS BLD VENIPUNCTURE: CPT | Performed by: PSYCHIATRY & NEUROLOGY

## 2024-08-09 PROCEDURE — 99000 SPECIMEN HANDLING OFFICE-LAB: CPT | Performed by: PSYCHIATRY & NEUROLOGY

## 2024-08-09 PROCEDURE — 80183 DRUG SCRN QUANT OXCARBAZEPIN: CPT | Mod: 90 | Performed by: PSYCHIATRY & NEUROLOGY

## 2024-08-09 PROCEDURE — 80177 DRUG SCRN QUAN LEVETIRACETAM: CPT | Performed by: PSYCHIATRY & NEUROLOGY

## 2024-08-09 PROCEDURE — 99214 OFFICE O/P EST MOD 30 MIN: CPT | Performed by: PSYCHIATRY & NEUROLOGY

## 2024-08-09 RX ORDER — FOLIC ACID 1 MG/1
1 TABLET ORAL 2 TIMES DAILY
Qty: 180 TABLET | Refills: 1 | Status: SHIPPED | OUTPATIENT
Start: 2024-08-09

## 2024-08-09 NOTE — PROGRESS NOTES
" NEUROLOGY PROGRESS NOTE   [facility]    Patient:Nancy Anderson  : 1995  Age: 29 year old  Today's Office Visit: 2024    Epilepsy Data:  Copy forward: \"the patient's seizures started in  when she was 17 years old. She was traveling with her basketball team and she had a seizure. She had no warning with that. She had loss of consciousness and per description she had a generalized tonic-clonic seizure with foaming at mouth and tongue biting. She had 3 of those seizures in 1 day 2 hours apart. Mom witnessed the last one, in which she shook all over and tilted her head and her face turned purple. These happen rarely since she started on Keppra, and they only happen when she misses her medication. The last one was 2014. The patient describes she has no warning. Her mom has noted that she turns her head but does not remember which side. She stiffens up and shakes all over. She has frothing at the mouth, tongue biting and urinary incontinence. They usually last 1-2 minutes. Her eyes roll back. It takes about 15 minutes for her to come back to baseline, then she has a headache and is very sleepy.      She has another type of spell which started when she was 11 years old. She calls them dizzy spells. They have never been diagnosed as seizures. These never changed on Keppra. With these, she gets a feeling of disorientation and feeling dizzy. She says sometimes shegets spinning sensation and sometimes lightheadedness and then she loses consciousness. Mom says she will stare into space and says \"yup, yup\". She may blink. She may fumble with her hands. She also will turn her head but not sure which side and is unresponsive. She is amnestic about the event. These currently happen 5 times a month. It depends on her activity. The frequency varies. These started when she was playing sports, and they could have happened 3-4 times a week when she was very active.   Triggers for her seizures are lack of " "sleep, exercise. With many of her seizures, when the blood sugar was checked, it was in the 50s, so mom thinks that not eating well is a trigger for her seizures, but she does not think so.      RISK FACTORS FOR EPILEPSY: No gestational or  complications. She was born 2 weeks early and weighed 9 pounds 14 ounces. She stayed in the hospital for 2 days for jaundice. She denies meningitis, encephalitis or febrile seizures. No family history of epilepsy. When she was between age 10-12, she used to hit her head when she was getting angry, but she never lost consciousness, and there was not a significant head injury that they are aware of.\"     History of present illness:     Nancy returns to the clinic for follow-up on her epilepsy.  She was last seen 2024.  She has not had any seizures since last visit.  She taking levetiracetam 1250 mg twice a day and oxcarbazepine 600 mg twice a day.   She reports to me that she is pregnant 16 wks 5days.  She is taking a prenatal multivitamin.  She is not taking folic acid.  Due date: 2025.      Her headaches worsened since she got pregnant. They happen 4 times a week.  She takes Tylenol and it helps.     Social:  she has moved to [facility].      Current Outpatient Medications   Medication Sig Dispense Refill    folic acid (FOLVITE) 1 MG tablet Take 1 tablet (1,000 mcg) by mouth daily 90 tablet 3    IBUPROFEN PO Take 800 mg by mouth every 6 hours as needed for moderate pain       levETIRAcetam (KEPPRA) 1000 MG tablet Take 1 tablet (1,000 mg) by mouth twice a day. 180 tablet 3    levETIRAcetam (KEPPRA) 250 MG tablet Take 1 tablet (250 mg) by mouth twice a day. 180 tablet 3    LORazepam (LORAZEPAM INTENSOL) 2 MG/ML concentrated solution Administer 2 mg lorazepam between cheek and gum after a GTC seizure or 2 complex partial seizures in 24 hours. 30 mL 0    OXcarbazepine (TRILEPTAL) 600 MG tablet Take 1 tablet (600 mg) by mouth 2 times daily 180 tablet 3     Exam:    BP " "115/75 (BP Location: Right arm, Patient Position: Sitting, Cuff Size: Adult Regular)   Pulse 73   Ht 1.83 m (6' 0.05\")   Wt 97.5 kg (215 lb)   BMI 29.12 kg/m       Wt Readings from Last 5 Encounters:   08/09/24 97.5 kg (215 lb)   04/08/19 85.8 kg (189 lb 3.2 oz)   02/05/18 85.2 kg (187 lb 12.8 oz)   03/17/16 81.1 kg (178 lb 14.4 oz)   08/20/15 87.5 kg (193 lb)     General Appearance: Alert, awake, cooperative, pleasant, NAD  Gait: steady  Attention Span:  Normal  Language/speech: no aphasia or dysarthria  Extraocular Movements:  Normal  Coordination:  Normal   Facial Strength:  Normal  Motor Exam: normal tone, bulk and strength 5/5 bilaterally    Assessment/Plan:     #1 focal epilepsy: Patient's seizures are controlled on dual therapy with levetiracetam and oxcarbazepine.  Video EEG monitoring in July 2015 showed seizures out of left temporal region.  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  The patient is taking levetiracetam 1250 mg twice daily and oxcarbazepine 600 mg twice daily.  She denies side effects.  I will check her seizure medication levels and place standing orders for checking them monthly.  The patient has moved to [place].  She is going to find a new neurologist there.    #2 pregnancy and epilepsy: The patient is pregnant.  She is 16 weeks 5 days.  She is not taking folic acid.  I advised her to start taking folate acid.        - Continue levetiracetam 1250 mg twice a day and oxcarbazepine 600 mg twice a day.      - Obtain ASD levels monthly until end of pregnancy.          As described above, I met with the patient for 25 minutes and during this time counseling was greater than 50% of the visit time.  Cheryl Kumari MD    "

## 2024-08-09 NOTE — NURSING NOTE
"Nancy Anderson's goals for this visit include:   Chief Complaint   Patient presents with    RECHECK     follow up in 6 months, patient is pregnant       She requests these members of her care team be copied on today's visit information: yes    PCP: No Ref-Primary, Physician    Referring Provider:  Referred Self, MD  No address on file    /75 (BP Location: Right arm, Patient Position: Sitting, Cuff Size: Adult Regular)   Pulse 73   Ht 1.83 m (6' 0.05\")   Wt 97.5 kg (215 lb)   BMI 29.12 kg/m      Do you need any medication refills at today's visit? Yes   Any meds   CHELSIE Waller, DENITA (AAMA)      "

## 2024-08-09 NOTE — LETTER
"2024      Nancy Anderson  [address]  [place] [address]      Dear Colleague,    Thank you for referring your patient, Nancy Anderson, to the [facility] [place]. Please see a copy of my visit note below.     NEUROLOGY PROGRESS NOTE   [facility]    Patient:Nancy Anderson  : 1995  Age: 29 year old  Today's Office Visit: 2024    Epilepsy Data:  Copy forward: \"the patient's seizures started in  when she was 17 years old. She was traveling with her basketball team and she had a seizure. She had no warning with that. She had loss of consciousness and per description she had a generalized tonic-clonic seizure with foaming at mouth and tongue biting. She had 3 of those seizures in 1 day 2 hours apart. Mom witnessed the last one, in which she shook all over and tilted her head and her face turned purple. These happen rarely since she started on Keppra, and they only happen when she misses her medication. The last one was 2014. The patient describes she has no warning. Her mom has noted that she turns her head but does not remember which side. She stiffens up and shakes all over. She has frothing at the mouth, tongue biting and urinary incontinence. They usually last 1-2 minutes. Her eyes roll back. It takes about 15 minutes for her to come back to baseline, then she has a headache and is very sleepy.      She has another type of spell which started when she was 11 years old. She calls them dizzy spells. They have never been diagnosed as seizures. These never changed on Keppra. With these, she gets a feeling of disorientation and feeling dizzy. She says sometimes shegets spinning sensation and sometimes lightheadedness and then she loses consciousness. Mom says she will stare into space and says \"yup, yup\". She may blink. She may fumble with her hands. She also will turn her head but not sure which side and is unresponsive. She is amnestic about the " "event. These currently happen 5 times a month. It depends on her activity. The frequency varies. These started when she was playing sports, and they could have happened 3-4 times a week when she was very active.   Triggers for her seizures are lack of sleep, exercise. With many of her seizures, when the blood sugar was checked, it was in the 50s, so mom thinks that not eating well is a trigger for her seizures, but she does not think so.      RISK FACTORS FOR EPILEPSY: No gestational or  complications. She was born 2 weeks early and weighed 9 pounds 14 ounces. She stayed in the hospital for 2 days for jaundice. She denies meningitis, encephalitis or febrile seizures. No family history of epilepsy. When she was between age 10-12, she used to hit her head when she was getting angry, but she never lost consciousness, and there was not a significant head injury that they are aware of.\"     History of present illness:     Nancy returns to the clinic for follow-up on her epilepsy.  She was last seen 2024.  She has not had any seizures since last visit.  She taking levetiracetam 1250 mg twice a day and oxcarbazepine 600 mg twice a day.   She reports to me that she is pregnant 16 wks 5days.  She is taking a prenatal multivitamin.  She is not taking folic acid.  Due date: 2025.      Her headaches worsened since she got pregnant. They happen 4 times a week.  She takes Tylenol and it helps.     Social:  she has moved to [facility].      Current Outpatient Medications   Medication Sig Dispense Refill     folic acid (FOLVITE) 1 MG tablet Take 1 tablet (1,000 mcg) by mouth daily 90 tablet 3     IBUPROFEN PO Take 800 mg by mouth every 6 hours as needed for moderate pain        levETIRAcetam (KEPPRA) 1000 MG tablet Take 1 tablet (1,000 mg) by mouth twice a day. 180 tablet 3     levETIRAcetam (KEPPRA) 250 MG tablet Take 1 tablet (250 mg) by mouth twice a day. 180 tablet 3     LORazepam (LORAZEPAM INTENSOL) 2 MG/ML " "concentrated solution Administer 2 mg lorazepam between cheek and gum after a GTC seizure or 2 complex partial seizures in 24 hours. 30 mL 0     OXcarbazepine (TRILEPTAL) 600 MG tablet Take 1 tablet (600 mg) by mouth 2 times daily 180 tablet 3     Exam:    /75 (BP Location: Right arm, Patient Position: Sitting, Cuff Size: Adult Regular)   Pulse 73   Ht 1.83 m (6' 0.05\")   Wt 97.5 kg (215 lb)   BMI 29.12 kg/m       Wt Readings from Last 5 Encounters:   08/09/24 97.5 kg (215 lb)   04/08/19 85.8 kg (189 lb 3.2 oz)   02/05/18 85.2 kg (187 lb 12.8 oz)   03/17/16 81.1 kg (178 lb 14.4 oz)   08/20/15 87.5 kg (193 lb)     General Appearance: Alert, awake, cooperative, pleasant, NAD  Gait: steady  Attention Span:  Normal  Language/speech: no aphasia or dysarthria  Extraocular Movements:  Normal  Coordination:  Normal   Facial Strength:  Normal  Motor Exam: normal tone, bulk and strength 5/5 bilaterally    Assessment/Plan:     #1 focal epilepsy: Patient's seizures are controlled on dual therapy with levetiracetam and oxcarbazepine.  Video EEG monitoring in July 2015 showed seizures out of left temporal region.  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  The patient is taking levetiracetam 1250 mg twice daily and oxcarbazepine 600 mg twice daily.  She denies side effects.  I will check her seizure medication levels and place standing orders for checking them monthly.  The patient has moved to [place].  She is going to find a new neurologist there.    #2 pregnancy and epilepsy: The patient is pregnant.  She is 16 weeks 5 days.  She is not taking folic acid.  I advised her to start taking folate acid.        - Continue levetiracetam 1250 mg twice a day and oxcarbazepine 600 mg twice a day.      - Obtain ASD levels monthly until end of pregnancy.          As described above, I met with the patient for 25 minutes and during this time counseling was greater than 50% of the visit time.  Cheryl " MD [place]      Again, thank you for allowing me to participate in the care of your patient.        Sincerely,        Cheryl Kumari MD

## 2024-08-12 LAB — 10OH-CARBAZEPINE SERPL-MCNC: 8 UG/ML

## 2024-08-16 RX ORDER — OXCARBAZEPINE 600 MG/1
TABLET, FILM COATED ORAL
Qty: 270 TABLET | Refills: 3 | Status: SHIPPED | OUTPATIENT
Start: 2024-08-16

## 2024-10-23 ENCOUNTER — TRANSFERRED RECORDS (OUTPATIENT)
Dept: HEALTH INFORMATION MANAGEMENT | Facility: CLINIC | Age: 29
End: 2024-10-23
Payer: COMMERCIAL

## 2024-11-01 ENCOUNTER — MYC REFILL (OUTPATIENT)
Dept: NEUROLOGY | Facility: CLINIC | Age: 29
End: 2024-11-01
Payer: COMMERCIAL

## 2024-11-01 DIAGNOSIS — G40.109 LOCALIZATION-RELATED EPILEPSY (H): ICD-10-CM

## 2024-11-01 RX ORDER — OXCARBAZEPINE 600 MG/1
TABLET, FILM COATED ORAL
Qty: 270 TABLET | Refills: 3 | Status: CANCELLED | OUTPATIENT
Start: 2024-11-01

## 2024-11-01 RX ORDER — LEVETIRACETAM 1000 MG/1
TABLET ORAL
Qty: 180 TABLET | Refills: 3 | Status: CANCELLED | OUTPATIENT
Start: 2024-11-01

## 2024-11-04 NOTE — TELEPHONE ENCOUNTER
Called patient and left voicemail to call back. Want to discuss refill request as she has no follow up and notes state she has moved to [place].

## 2024-11-16 ENCOUNTER — HEALTH MAINTENANCE LETTER (OUTPATIENT)
Age: 29
End: 2024-11-16